# Patient Record
Sex: FEMALE | Race: WHITE | NOT HISPANIC OR LATINO | Employment: FULL TIME | URBAN - METROPOLITAN AREA
[De-identification: names, ages, dates, MRNs, and addresses within clinical notes are randomized per-mention and may not be internally consistent; named-entity substitution may affect disease eponyms.]

---

## 2017-03-01 DIAGNOSIS — Z01.812 PRE-OPERATIVE LABORATORY EXAMINATION: ICD-10-CM

## 2017-03-01 LAB
BASOPHILS # BLD AUTO: 0.5 % (ref 0–1.8)
BASOPHILS # BLD: 0.03 K/UL (ref 0–0.12)
EOSINOPHIL # BLD AUTO: 0.05 K/UL (ref 0–0.51)
EOSINOPHIL NFR BLD: 0.9 % (ref 0–6.9)
ERYTHROCYTE [DISTWIDTH] IN BLOOD BY AUTOMATED COUNT: 59.5 FL (ref 35.9–50)
HCT VFR BLD AUTO: 41.7 % (ref 37–47)
HGB BLD-MCNC: 13.7 G/DL (ref 12–16)
IMM GRANULOCYTES # BLD AUTO: 0.03 K/UL (ref 0–0.11)
IMM GRANULOCYTES NFR BLD AUTO: 0.5 % (ref 0–0.9)
LYMPHOCYTES # BLD AUTO: 1.85 K/UL (ref 1–4.8)
LYMPHOCYTES NFR BLD: 33.8 % (ref 22–41)
MCH RBC QN AUTO: 39 PG (ref 27–33)
MCHC RBC AUTO-ENTMCNC: 32.9 G/DL (ref 33.6–35)
MCV RBC AUTO: 118.8 FL (ref 81.4–97.8)
MONOCYTES # BLD AUTO: 0.23 K/UL (ref 0–0.85)
MONOCYTES NFR BLD AUTO: 4.2 % (ref 0–13.4)
NEUTROPHILS # BLD AUTO: 3.29 K/UL (ref 2–7.15)
NEUTROPHILS NFR BLD: 60.1 % (ref 44–72)
NRBC # BLD AUTO: 0 K/UL
NRBC BLD AUTO-RTO: 0 /100 WBC
PLATELET # BLD AUTO: 347 K/UL (ref 164–446)
PMV BLD AUTO: 10.2 FL (ref 9–12.9)
RBC # BLD AUTO: 3.51 M/UL (ref 4.2–5.4)
WBC # BLD AUTO: 5.5 K/UL (ref 4.8–10.8)

## 2017-03-01 PROCEDURE — 85025 COMPLETE CBC W/AUTO DIFF WBC: CPT

## 2017-03-01 PROCEDURE — 36415 COLL VENOUS BLD VENIPUNCTURE: CPT

## 2017-03-09 ENCOUNTER — HOSPITAL ENCOUNTER (OUTPATIENT)
Facility: MEDICAL CENTER | Age: 47
End: 2017-03-09
Attending: SPECIALIST | Admitting: SPECIALIST
Payer: COMMERCIAL

## 2017-03-09 VITALS
RESPIRATION RATE: 14 BRPM | DIASTOLIC BLOOD PRESSURE: 85 MMHG | TEMPERATURE: 97.5 F | HEART RATE: 86 BPM | HEIGHT: 69 IN | WEIGHT: 160.94 LBS | BODY MASS INDEX: 23.84 KG/M2 | OXYGEN SATURATION: 96 % | SYSTOLIC BLOOD PRESSURE: 122 MMHG

## 2017-03-09 PROBLEM — H90.71: Status: ACTIVE | Noted: 2017-03-09

## 2017-03-09 LAB — HCG SERPL QL: NEGATIVE

## 2017-03-09 PROCEDURE — 502573 HCHG PACK, ENT: Performed by: SPECIALIST

## 2017-03-09 PROCEDURE — 501423 HCHG SPONGE, SURGIFOAM 12X7: Performed by: SPECIALIST

## 2017-03-09 PROCEDURE — 501669 HCHG TUBING, IRRIG. STR. SHOT: Performed by: SPECIALIST

## 2017-03-09 PROCEDURE — A9270 NON-COVERED ITEM OR SERVICE: HCPCS

## 2017-03-09 PROCEDURE — 110371 HCHG SHELL REV 272: Performed by: SPECIALIST

## 2017-03-09 PROCEDURE — 160041 HCHG SURGERY MINUTES - EA ADDL 1 MIN LEVEL 4: Performed by: SPECIALIST

## 2017-03-09 PROCEDURE — 501838 HCHG SUTURE GENERAL: Performed by: SPECIALIST

## 2017-03-09 PROCEDURE — 500073 HCHG BLADE, BEAVER (EYE): Performed by: SPECIALIST

## 2017-03-09 PROCEDURE — 700102 HCHG RX REV CODE 250 W/ 637 OVERRIDE(OP)

## 2017-03-09 PROCEDURE — A4606 OXYGEN PROBE USED W OXIMETER: HCPCS | Performed by: SPECIALIST

## 2017-03-09 PROCEDURE — 700101 HCHG RX REV CODE 250

## 2017-03-09 PROCEDURE — 160002 HCHG RECOVERY MINUTES (STAT): Performed by: SPECIALIST

## 2017-03-09 PROCEDURE — 500072 HCHG BLADE, BEAVER (ENT): Performed by: SPECIALIST

## 2017-03-09 PROCEDURE — 501422 HCHG SPONGE, SURGIFOAM 100: Performed by: SPECIALIST

## 2017-03-09 PROCEDURE — 110382 HCHG SHELL REV 271: Performed by: SPECIALIST

## 2017-03-09 PROCEDURE — 500427 HCHG DRESSING, GELFILM (ENT): Performed by: SPECIALIST

## 2017-03-09 PROCEDURE — 500123 HCHG BOVIE, CONTROL W/BLADE: Performed by: SPECIALIST

## 2017-03-09 PROCEDURE — 160029 HCHG SURGERY MINUTES - 1ST 30 MINS LEVEL 4: Performed by: SPECIALIST

## 2017-03-09 PROCEDURE — 502240 HCHG MISC OR SUPPLY RC 0272: Performed by: SPECIALIST

## 2017-03-09 PROCEDURE — 160048 HCHG OR STATISTICAL LEVEL 1-5: Performed by: SPECIALIST

## 2017-03-09 PROCEDURE — 160036 HCHG PACU - EA ADDL 30 MINS PHASE I: Performed by: SPECIALIST

## 2017-03-09 PROCEDURE — 160009 HCHG ANES TIME/MIN: Performed by: SPECIALIST

## 2017-03-09 PROCEDURE — 700111 HCHG RX REV CODE 636 W/ 250 OVERRIDE (IP)

## 2017-03-09 PROCEDURE — 500432 HCHG DRESSING, KLING 3: Performed by: SPECIALIST

## 2017-03-09 PROCEDURE — 84703 CHORIONIC GONADOTROPIN ASSAY: CPT

## 2017-03-09 PROCEDURE — 88304 TISSUE EXAM BY PATHOLOGIST: CPT

## 2017-03-09 PROCEDURE — 160035 HCHG PACU - 1ST 60 MINS PHASE I: Performed by: SPECIALIST

## 2017-03-09 PROCEDURE — 500433 HCHG DRESSING, KLING 4': Performed by: SPECIALIST

## 2017-03-09 RX ORDER — SODIUM CHLORIDE, SODIUM LACTATE, POTASSIUM CHLORIDE, CALCIUM CHLORIDE 600; 310; 30; 20 MG/100ML; MG/100ML; MG/100ML; MG/100ML
1000 INJECTION, SOLUTION INTRAVENOUS
Status: COMPLETED | OUTPATIENT
Start: 2017-03-09 | End: 2017-03-09

## 2017-03-09 RX ORDER — MOXIFLOXACIN 5 MG/ML
SOLUTION/ DROPS OPHTHALMIC
Status: DISCONTINUED
Start: 2017-03-09 | End: 2017-03-09 | Stop reason: HOSPADM

## 2017-03-09 RX ORDER — LIDOCAINE HYDROCHLORIDE AND EPINEPHRINE 10; 10 MG/ML; UG/ML
INJECTION, SOLUTION INFILTRATION; PERINEURAL
Status: DISCONTINUED
Start: 2017-03-09 | End: 2017-03-09 | Stop reason: HOSPADM

## 2017-03-09 RX ORDER — BACITRACIN ZINC 500 [USP'U]/G
OINTMENT TOPICAL
Status: DISCONTINUED | OUTPATIENT
Start: 2017-03-09 | End: 2017-03-09 | Stop reason: HOSPADM

## 2017-03-09 RX ORDER — HYDROCODONE BITARTRATE AND ACETAMINOPHEN 5; 325 MG/1; MG/1
1 TABLET ORAL EVERY 4 HOURS PRN
Qty: 20 TAB | Refills: 0 | Status: SHIPPED | OUTPATIENT
Start: 2017-03-09

## 2017-03-09 RX ORDER — LIDOCAINE HYDROCHLORIDE AND EPINEPHRINE 10; 10 MG/ML; UG/ML
INJECTION, SOLUTION INFILTRATION; PERINEURAL
Status: DISCONTINUED | OUTPATIENT
Start: 2017-03-09 | End: 2017-03-09 | Stop reason: HOSPADM

## 2017-03-09 RX ORDER — EPINEPHRINE 1 MG/ML(1)
AMPUL (ML) INJECTION
Status: DISCONTINUED | OUTPATIENT
Start: 2017-03-09 | End: 2017-03-09 | Stop reason: HOSPADM

## 2017-03-09 RX ORDER — MOXIFLOXACIN 5 MG/ML
SOLUTION/ DROPS OPHTHALMIC
Status: DISCONTINUED | OUTPATIENT
Start: 2017-03-09 | End: 2017-03-09 | Stop reason: HOSPADM

## 2017-03-09 RX ORDER — MIDAZOLAM HYDROCHLORIDE 1 MG/ML
INJECTION INTRAMUSCULAR; INTRAVENOUS
Status: DISCONTINUED
Start: 2017-03-09 | End: 2017-03-09 | Stop reason: HOSPADM

## 2017-03-09 RX ORDER — HYDROCODONE BITARTRATE AND ACETAMINOPHEN 5; 325 MG/1; MG/1
1 TABLET ORAL EVERY 4 HOURS PRN
Status: DISCONTINUED | OUTPATIENT
Start: 2017-03-09 | End: 2017-03-09 | Stop reason: HOSPADM

## 2017-03-09 RX ORDER — OXYCODONE HCL 5 MG/5 ML
SOLUTION, ORAL ORAL
Status: COMPLETED
Start: 2017-03-09 | End: 2017-03-09

## 2017-03-09 RX ORDER — ONDANSETRON 2 MG/ML
4 INJECTION INTRAMUSCULAR; INTRAVENOUS
Status: DISCONTINUED | OUTPATIENT
Start: 2017-03-09 | End: 2017-03-09 | Stop reason: HOSPADM

## 2017-03-09 RX ADMIN — SODIUM CHLORIDE, SODIUM LACTATE, POTASSIUM CHLORIDE, CALCIUM CHLORIDE 1000 ML: 600; 310; 30; 20 INJECTION, SOLUTION INTRAVENOUS at 09:28

## 2017-03-09 RX ADMIN — OXYCODONE HYDROCHLORIDE 5 MG: 5 SOLUTION ORAL at 14:37

## 2017-03-09 ASSESSMENT — PAIN SCALES - GENERAL
PAINLEVEL_OUTOF10: 0

## 2017-03-09 NOTE — OR SURGEON
Immediate Post-Operative Note      PreOp Diagnosis: mixed hearing loss, ossicular discontinuity right ear    PostOp Diagnosis: same, cholesteatoma    Procedure(s):  TYMPANOPLASTY right ear with OSSICULAR RECONSTRUCTION CHAIN WITH CARTILAGE GRAFT - Wound Class: Clean Contaminated;  Needle EMG facial nerve with monitoring    Surgeon(s):  Himanshu Whelan M.D.    Anesthesiologist/Type of Anesthesia:  Anesthesiologist: Reema Grullon M.D./General LMA    Surgical Staff:  Circulator: Jory Mcclain R.N.; Kaylin Jones R.N.  Relief Scrub: Corina Del Rosario  Scrub Person: Crystal Rothman    Specimen: cholesteatoma    Estimated Blood Loss: <5ml    Findings: dornhoffer PORP repositioned, repair scutum defect with cartilage graft.    Complications: none        3/9/2017 1:14 PM Himanshu Whelan

## 2017-03-09 NOTE — IP AVS SNAPSHOT
" Home Care Instructions                                                                                                                Name:Michelle Corea  Medical Record Number:[id number]  CSN: [id number]    YOB: 1970   Age: 46 y.o.  Sex: female  HT:1.753 m (5' 9\") WT: 73 kg (160 lb 15 oz)          Admit Date: 3/9/2017     Discharge Date:   Today's Date: 3/9/2017  Attending Doctor:  Himanshu Whelan M.D.                  Allergies:  Nkda                Discharge Instructions         ACTIVITY: Rest and take it easy for the first 24 hours.  A responsible adult is recommended to remain with you during that time.  It is normal to feel sleepy.  We encourage you to not do anything that requires balance, judgment or coordination.    MILD FLU-LIKE SYMPTOMS ARE NORMAL. YOU MAY EXPERIENCE GENERALIZED MUSCLE ACHES, THROAT IRRITATION, HEADACHE AND/OR SOME NAUSEA.    FOR 24 HOURS DO NOT:  Drive, operate machinery or run household appliances.  Drink beer or alcoholic beverages.   Make important decisions or sign legal documents.    SPECIAL INSTRUCTIONS: *SEE POST OPERATIVE INSTRUCTION SHEET, SLEEP WITH RIGHT EAR UP**    DIET: To avoid nausea, slowly advance diet as tolerated, avoiding spicy or greasy foods for the first day.  Add more substantial food to your diet according to your physician's instructions.  Babies can be fed formula or breast milk as soon as they are hungry.  INCREASE FLUIDS AND FIBER TO AVOID CONSTIPATION.    SURGICAL DRESSING/BATHING: *KEEP EAR CLEAN AND DRY**    FOLLOW-UP APPOINTMENT:  A follow-up appointment should be arranged with your doctor in; call to schedule.    You should CALL YOUR PHYSICIAN if you develop:  Fever greater than 101 degrees F.  Pain not relieved by medication, or persistent nausea or vomiting.  Excessive bleeding (blood soaking through dressing) or unexpected drainage from the wound.  Extreme redness or swelling around the incision site, drainage of pus or foul smelling " drainage.  Inability to urinate or empty your bladder within 8 hours.  Problems with breathing or chest pain.    You should call 911 if you develop problems with breathing or chest pain.  If you are unable to contact your doctor or surgical center, you should go to the nearest emergency room or urgent care center.  Physician's telephone #: *[phone]**    If any questions arise, call your doctor.  If your doctor is not available, please feel free to call the Surgical Center at [phone].  The Center is open Monday through Friday from 7AM to 7PM.  You can also call the [employer] HOTLINE open 24 hours/day, 7 days/week and speak to a nurse at [phone], or toll free at [phone].    A registered nurse may call you a few days after your surgery to see how you are doing after your procedure.    MEDICATIONS: Resume taking daily medication.  Take prescribed pain medication with food.  If no medication is prescribed, you may take non-aspirin pain medication if needed.  PAIN MEDICATION CAN BE VERY CONSTIPATING.  Take a stool softener or laxative such as senokot, pericolace, or milk of magnesia if needed.    Prescription given for *NORCO**.  Last pain medication given at *2:30 Pm next dose due at 6:30 PM**.    If your physician has prescribed pain medication that includes Acetaminophen (Tylenol), do not take additional Acetaminophen (Tylenol) while taking the prescribed medication.    Depression / Suicide Risk    As you are discharged from this [facility] facility, it is important to learn how to keep safe from harming yourself.    Recognize the warning signs:  · Abrupt changes in personality, positive or negative- including increase in energy   · Giving away possessions  · Change in eating patterns- significant weight changes-  positive or negative  · Change in sleeping patterns- unable to sleep or sleeping all the time   · Unwillingness or inability to communicate  · Depression  · Unusual sadness,  discouragement and loneliness  · Talk of wanting to die  · Neglect of personal appearance   · Rebelliousness- reckless behavior  · Withdrawal from people/activities they love  · Confusion- inability to concentrate     If you or a loved one observes any of these behaviors or has concerns about self-harm, here's what you can do:  · Talk about it- your feelings and reasons for harming yourself  · Remove any means that you might use to hurt yourself (examples: pills, rope, extension cords, firearm)  · Get professional help from the community (Mental Health, Substance Abuse, psychological counseling)  · Do not be alone:Call your Safe Contact- someone whom you trust who will be there for you.  · Call your local CRISIS HOTLINE 260-1630 or 451-023-4037  · Call your local Children's Mobile Crisis Response Team [place] (235) 475-2814 or www.[employer]  · Call the toll free National Suicide Prevention Hotlines   · National Suicide Prevention Lifeline 126-440-IWKL (0897)  · [place] Hope Line Network 800-SUICIDE (380-8788)       Medication List      START taking these medications        Instructions    hydrocodone-acetaminophen 5-325 MG Tabs per tablet   Commonly known as:  NORCO    Take 1 Tab by mouth every four hours as needed.   Dose:  1 Tab         CONTINUE taking these medications        Instructions    aspirin EC 81 MG Tbec   Commonly known as:  ECOTRIN    Take 81 mg by mouth every day.   Dose:  81 mg       hydroxyurea 500 MG Caps   Commonly known as:  HYDREA    Take 500 mg by mouth every day. 1 tab day 1, 2 tabs day 2 and 3- reapeat   Dose:  500 mg       levothyroxine 100 MCG Tabs   Commonly known as:  SYNTHROID    Take 1 Tab by mouth every day.   Dose:  100 mcg               Medication Information     Above and/or attached are the medications your physician expects you to take upon discharge. Review all of your home medications and newly ordered medications with your doctor and/or pharmacist. Follow  medication instructions as directed by your doctor and/or pharmacist. Please keep your medication list with you and share with your physician. Update the information when medications are discontinued, doses are changed, or new medications (including over-the-counter products) are added; and carry medication information at all times in the event of emergency situations.        Resources     Quit Smoking / Tobacco Use:    I understand the use of any tobacco products increases my chance of suffering from future heart disease or stroke and could cause other illnesses which may shorten my life. Quitting the use of tobacco products is the single most important thing I can do to improve my health. For further information on smoking / tobacco cessation call a Toll Free Quit Line at 1-148.596.3566 (*National [employer] [place]) or 1-225.280.9502 (American Lung Association) or you can access the web based program at www.lungusa.org.    [place] Tobacco Users Help Line:  (373) 743-2239       Toll Free: 1-199.379.3075  Quit Tobacco Program [facility] (895)605-0363    Crisis Hotline:    [place] Crisis Hotline:  9-844-WPKVSEP or 1-413.448.4527    [place] Crisis Hotline:    1-474.858.9175 or 725-742-4806    Discharge Survey:   Thank you for choosing [facility]. We hope we did everything we could to make your hospital stay a pleasant one. You may be receiving a survey and we would appreciate your time and participation in answering the questions. Your input is very valuable to us in our efforts to improve our service to our patients and their families.            Signatures     My signature on this form indicates that:    1. I acknowledge receipt and understanding of these Home Care Instruction.  2. My questions regarding this information have been answered to my satisfaction.  3. I have formulated a plan with my discharge nurse to obtain my prescribed medications for  home.    __________________________________      __________________________________                   Patient Signature                                 Guardian/Responsible Adult Signature      __________________________________                 __________       ________                       Nurse Signature                                               Date                 Time

## 2017-03-09 NOTE — DISCHARGE INSTRUCTIONS
ACTIVITY: Rest and take it easy for the first 24 hours.  A responsible adult is recommended to remain with you during that time.  It is normal to feel sleepy.  We encourage you to not do anything that requires balance, judgment or coordination.    MILD FLU-LIKE SYMPTOMS ARE NORMAL. YOU MAY EXPERIENCE GENERALIZED MUSCLE ACHES, THROAT IRRITATION, HEADACHE AND/OR SOME NAUSEA.    FOR 24 HOURS DO NOT:  Drive, operate machinery or run household appliances.  Drink beer or alcoholic beverages.   Make important decisions or sign legal documents.    SPECIAL INSTRUCTIONS: *SEE POST OPERATIVE INSTRUCTION SHEET, SLEEP WITH RIGHT EAR UP**    DIET: To avoid nausea, slowly advance diet as tolerated, avoiding spicy or greasy foods for the first day.  Add more substantial food to your diet according to your physician's instructions.  Babies can be fed formula or breast milk as soon as they are hungry.  INCREASE FLUIDS AND FIBER TO AVOID CONSTIPATION.    SURGICAL DRESSING/BATHING: *KEEP EAR CLEAN AND DRY**    FOLLOW-UP APPOINTMENT:  A follow-up appointment should be arranged with your doctor in; call to schedule.    You should CALL YOUR PHYSICIAN if you develop:  Fever greater than 101 degrees F.  Pain not relieved by medication, or persistent nausea or vomiting.  Excessive bleeding (blood soaking through dressing) or unexpected drainage from the wound.  Extreme redness or swelling around the incision site, drainage of pus or foul smelling drainage.  Inability to urinate or empty your bladder within 8 hours.  Problems with breathing or chest pain.    You should call 911 if you develop problems with breathing or chest pain.  If you are unable to contact your doctor or surgical center, you should go to the nearest emergency room or urgent care center.  Physician's telephone #: *[phone]**    If any questions arise, call your doctor.  If your doctor is not available, please feel free to call the Surgical Center at [phone].   The Center is open Monday through Friday from 7AM to 7PM.  You can also call the HEALTH HOTLINE open 24 hours/day, 7 days/week and speak to a nurse at [phone], or toll free at [phone].    A registered nurse may call you a few days after your surgery to see how you are doing after your procedure.    MEDICATIONS: Resume taking daily medication.  Take prescribed pain medication with food.  If no medication is prescribed, you may take non-aspirin pain medication if needed.  PAIN MEDICATION CAN BE VERY CONSTIPATING.  Take a stool softener or laxative such as senokot, pericolace, or milk of magnesia if needed.    Prescription given for *NORCO**.  Last pain medication given at *2:30 Pm next dose due at 6:30 PM**.    If your physician has prescribed pain medication that includes Acetaminophen (Tylenol), do not take additional Acetaminophen (Tylenol) while taking the prescribed medication.    Depression / Suicide Risk    As you are discharged from this [facility] facility, it is important to learn how to keep safe from harming yourself.    Recognize the warning signs:  · Abrupt changes in personality, positive or negative- including increase in energy   · Giving away possessions  · Change in eating patterns- significant weight changes-  positive or negative  · Change in sleeping patterns- unable to sleep or sleeping all the time   · Unwillingness or inability to communicate  · Depression  · Unusual sadness, discouragement and loneliness  · Talk of wanting to die  · Neglect of personal appearance   · Rebelliousness- reckless behavior  · Withdrawal from people/activities they love  · Confusion- inability to concentrate     If you or a loved one observes any of these behaviors or has concerns about self-harm, here's what you can do:  · Talk about it- your feelings and reasons for harming yourself  · Remove any means that you might use to hurt yourself (examples: pills, rope, extension cords, firearm)  · Get  professional help from the community (Mental Health, Substance Abuse, psychological counseling)  · Do not be alone:Call your Safe Contact- someone whom you trust who will be there for you.  · Call your local CRISIS HOTLINE 268-3618 or 191-947-3336  · Call your local Children's Mobile Crisis Response Team [place] (259) 197-1569 or www.[employer]  · Call the toll free National Suicide Prevention Hotlines   · National Suicide Prevention Lifeline 494-512-CXQF (7442)  · National Hope Line Network 800-SUICIDE (070-4376)

## 2017-03-09 NOTE — OR NURSING
1311: Rec'd pt from OR with Dr. JULIO CÉSAR Grullon, no airway present, vss, no distress noted, breathing is even and unlabored, right ear has a cottonball and band-aid present, cdi, behind ear a band-aid is also present, cdi, hob elevated, left chest donor graft incision present, covered with tegaderm and minimal sanguinous fluid present under dressing,   1330: pt remains asleep,   1340: pt remains asleep but arouses to voice, no c/o pain, script given to ,  1400: pt taking sips of water, still no c/o pain,   1415:  brought to bedside,   1420: hand-off to Rema BENSON.

## 2017-03-09 NOTE — OR NURSING
1437 Oral pain medication given per Pt request to prevent pain.  Report received from Anel BENSON.  right ear has a cotton ball and band-aid present, CDI, behind ear a band-aid is also present, CDI, hob elevated, left chest donor graft incision present, covered with Tegaderm and minimal sanguinous fluid present under dressing,      1445 Pt eating otter pop.      1515 Pt voided without difficulty.  Steady gait noted.  No c/o of n/v.  States tolerable pain.       1530 Dc instructions completed with Pt and her .        1556 Dc to car via wheel chair, pt has all belongings.

## 2017-03-09 NOTE — IP AVS SNAPSHOT
3/9/2017          Michelle Corea  [address] [place] [address]    Dear Michelle:    [facility] wants to ensure your discharge home is safe and you or your loved ones have had all your questions answered regarding your care after you leave the hospital.    You may receive a telephone call within two days of your discharge.  This call is to make certain you understand your discharge instructions as well as ensure we provided you with the best care possible during your stay with us.     The call will only last approximately 3-5 minutes and will be done by a nurse.    Once again, we want to ensure your discharge home is safe and that you have a clear understanding of any next steps in your care.  If you have any questions or concerns, please do not hesitate to contact us, we are here for you.  Thank you for choosing [facility] for your healthcare needs.    Sincerely,    Todd Henry    [facility]

## 2017-03-10 NOTE — OP REPORT
DATE OF SERVICE:  03/09/2017    SURGEON:  Himanshu Whelan MD    ASSISTANT:  None.    ANESTHESIA:  General given per laryngeal mask airway by Dr. Grullon.    PREOPERATIVE DIAGNOSIS:  Mixed hearing loss, ossicular discontinuity, right   ear.    POSTOPERATIVE DIAGNOSIS:    Mixed hearing loss, ossicular discontinuity, right   ear, recurrent cholesteatoma.    NAME OF THE PROCEDURE:  Left tympanoplasty with ossicular chain   reconstruction, endoscopic ear surgery approach, needle EMG of facial nerve   with intraoperative neurophysiology monitoring and testing.    INDICATIONS FOR PROCEDURE:  Patient has had 3 prior major ear surgeries   including tympanomastoidectomy for cholesteatoma as well as tympanoplasties   with ossicular chain reconstruction.  Most recently, a Dornhoffer prosthesis   was utilized for ossicular chain reconstruction.  Examination has shown the   prosthesis to have rotated and the patient's hearing has deteriorated with a   mixed hearing loss noted.  Gross cholesteatoma recurrence was not apparent on   examination in the office.  She presents now for surgical intervention.  Note   that a dehiscence along the facial nerve fallopian canal had been previously   noted with her prior surgery and so facial nerve monitoring is indicated.    DESCRIPTION OF PROCEDURE:  Patient was brought in the operating room and   placed in supine position on the operating table.  General anesthesia was   induced and laryngeal mask airway was inserted to maintain the airway.  Eyes   were protected with taping and table was turned to 180 degrees.  The head was   then rotated to the left side and attention was directed to the right ear   which had been marked preoperatively.    Needle EMG electrodes were placed in orbicularis oris and orbicularis oculi   muscle groups.  Appropriate grounding electrodes were then placed.  The nerve   integrity monitor was then utilized.  Interelectrode impedance was noted to be   less than 0.2  kilo ohms for both sets of electrodes.  Appropriate EMG   responses were noted with external stimulation and continuous monitoring was   then undertaken throughout the procedure.    Hair was shaved very slightly where a one and half fingerbreadths behind the   pinna was exposed and one fingerbreadth above the pinna was exposed.  The   pinna was then isolated from the scalp hair with Steri-Drapes.  Approximately   4-5 mL of 1% Xylocaine with 1:100,000 epinephrine solution was used to   infiltrate along the right postauricular sulcus, the right posterior conchal   cartilage area, and the cavum conchal area anteriorly.  An additional 0.5 to   0.75 mL of local anesthetic solution was then used to infiltrate along the   external auditory canal meatus in a perimeatal fashion.  Patient's right ear   was then prepped and draped in the usual fashion for ear surgery.    First, the operating microscope was used to visualize the ear.  The patient   was noted to have a relatively small external auditory canal meatus, unable to   visualize the epitympanic area well.  Patient was noted to have retraction of   the tympanic membrane, particularly along its superior aspect into the   epitympanic area with what appears to be rotation of the ossicular prosthesis   inferiorly onto the promontory.    The 0-degree pediatric endoscope was then used for visualization of the   transcanal.  With the improved visualization of the scope, I am able to see   the retraction into the epitympanic area.  There does appear to be some   squamous debris accumulating along the posterior superior aspect, but no   inflammation or granulation tissue is noted.    A transverse canal wall incision was then made along the posterior canal wall   using an angled [place] blade and a weapon was then utilized under endoscopic   visualization to elevate a tympanomeatal flap.  With elevation of the   posterior canal wall skin, cotton balls were impregnated with  epinephrine and   used topically to control bleeding.  Significant adherence was noted along the   posterior canal wall skin in the region of the annular sulcus.  With prior   surgeries, significant scarring has occurred such that with elevation of the   flap, a tear was created along the superior aspect of the posterior canal wall   remnant.  Despite the tear, the posterior aspect of the tympanic membrane was   carefully elevated off what appears to be a cartilage remnant overlying the   partial ossicular reconstruction prosthesis.  The cartilages left attached to   the tympanic membrane remnant and was disarticulated from the head of the   prosthesis with the Lindo needle.  The chorda tympani nerve was visualized and   noted to be intact and draped around the prosthesis at its stem.    A small amount of the bone at the area of the superior aspect of the posterior   canal at the junction of the scutum remnant was taken down with a curette to   allow for better visualization of the epitympanic area.  The epithelium along   the epitympanum and scutum remnant was then carefully elevated with removal of   a portion of the tissue which was felt to be compatible with cholesteatoma   sac along the posterior superior aspect of the epitympanum.  Note that a   30-degree pediatric endoscope was used for visualization around the corner and   into the epitympanic area to allow for sufficient removal using both _____   elevator as well as angled cup forceps.  During dissection in this area, some   stimulation of the facial nerve was noted.  The nerve integrity stimulus probe   was then utilized at a stimulus intensity of 0.3 milliamps to trace across   the horizontal portion of the fallopian canal in second genu with a distinct   dehiscent area was not readily appreciated.  Care was taken with elevation of   the retracted epithelium over this area, however, to avoid injury to the   facial nerve.    Once the epithelium had been  elevated sufficiently from the epitympanic area   in the upper aspect of the TM remnant, the ossicular prosthesis was again   noted to be inferiorly displaced  onto the promontory with the titanium   sitting on the stapes superstructure.  The prosthesis was then manipulated   with angled hook and Lindo needle into a proper position, seated on the stapes   superstructure, which appears to be intact.  Note that no cholesteatoma was   noted in the oval window area.  Once the prosthesis was positioned, the chorda   tympani nerve was noted to still be intact.  There is a malleus remnant   anteriorly and the notch of the Dornhoffer prosthesis was tucked under the   malleus remnant again.    At this point, attention was directed postauricularly.  An incision was made   along the superior aspect of the postauricular sulcus through the skin and   subcutaneous tissue and bleeding was controlled with electrocautery.  The soft   tissue overlying the posterior aspect of the conchal cartilage in the region   of the cava and tobias, then thinned and then the cavum conchal cartilage was   incised sharply with a 15 blade.  The anterior aspect of the cavum cartilage   soft tissue was elevated with scissors and a portion of the cavum conchal   cartilage was then excised.    Perichondrium from the cavum cartilage was cleared away with a weapon.  The   amount of perichondrium harvested was felt to be insufficient for graft use   for the tympanoplasty repair.  A segment of what appears to be temporalis   fascia along the superior aspect of the postauricular incision was identified   just above the pinna and a portion of this was removed for graft use.  The   removed temporalis fascia tissue was placed in a fascial press and then air   dried for graft use.  Bleeding in the wound was again controlled with   electrocautery as necessary.  Postauricular incision was then closed using   interrupted buried subcutaneous stitches of 4-0 Vicryl  followed by a running   simple stitch of 5-0 Prolene.    A portion of the cavum conchal cartilage which had been excised for graft use,   was divided from the remaining tissue and utilized as a graft to cover the   scutal defect and reconstruct the posterior canal wall superiorly.  Gelfoam   was then packed over the promontory to support the Dornhoffer prosthesis in   position.  The cartilage graft was then placed over the Dornhoffer prosthesis   head.  The previously harvested fascia was then trimmed and a portion was   placed over the epitympanum, scutal repair area, and the _____ tympanic   membrane remnant and posterior canal wall flap were then repositioned on top   of the fascial graft.  Gelfoam was then packed over the tympanic membrane   repair area followed by the instillation of bacitracin ointment into the   external canal to fill it as a packing.    At this point, cotton ball and Band-Aid dressings were applied to the external   canal.  A Band-Aid was applied postauricularly.  Needle EMG electrodes were   removed.  The patient was subsequently awakened from anesthesia and extubated   without difficulty.  She was taken from the operating room to the recovery   area, awake, breathing on her own in stable condition.  There were no   complications.  Blood loss during the procedure is felt to be less than 10 mL.       ____________________________________     MD ESTEVAN HI / VINEET    DD:  03/09/2017 16:57:40  DT:  03/09/2017 19:02:18    D#:  [id number]  Job#:  [id number]

## 2017-03-15 NOTE — OP REPORT
DATE OF SERVICE:  03/09/2017    SURGEON:  Himanshu Whelan MD    ASSISTANT:  None.    ANESTHESIA:  General given per laryngeal mask airway by Dr. Grullon.    PREOPERATIVE DIAGNOSIS:  Mixed hearing loss, ossicular discontinuity, right   ear.    POSTOPERATIVE DIAGNOSIS:    Mixed hearing loss, ossicular discontinuity, right   ear, recurrent cholesteatoma.    NAME OF THE PROCEDURE:  Right tympanoplasty with ossicular chain   reconstruction, endoscopic ear surgery approach, needle EMG of facial nerve   with intraoperative neurophysiology monitoring and testing.    INDICATIONS FOR PROCEDURE:  Patient has had 3 prior major ear surgeries   including tympanomastoidectomy for cholesteatoma as well as tympanoplasties   with ossicular chain reconstruction.  Most recently, a Dornhoffer prosthesis   was utilized for ossicular chain reconstruction.  Examination has shown the   prosthesis to have rotated and the patient's hearing has deteriorated with a   mixed hearing loss noted.  Gross cholesteatoma recurrence was not apparent on   examination in the office.  She presents now for surgical intervention.  Note   that a dehiscence along the facial nerve fallopian canal had been previously   noted with her prior surgery and so facial nerve monitoring is indicated.    DESCRIPTION OF PROCEDURE:  Patient was brought in the operating room and   placed in supine position on the operating table.  General anesthesia was   induced and laryngeal mask airway was inserted to maintain the airway.  Eyes   were protected with taping and table was turned to 180 degrees.  The head was   then rotated to the left side and attention was directed to the right ear   which had been marked preoperatively.    Needle EMG electrodes were placed in orbicularis oris and orbicularis oculi   muscle groups.  Appropriate grounding electrodes were then placed.  The nerve   integrity monitor was then utilized.  Interelectrode impedance was noted to be   less than 0.2  kilo ohms for both sets of electrodes.  Appropriate EMG   responses were noted with external stimulation and continuous monitoring was   then undertaken throughout the procedure.    Hair was shaved very slightly where a one and half fingerbreadths behind the   pinna was exposed and one fingerbreadth above the pinna was exposed.  The   pinna was then isolated from the scalp hair with Steri-Drapes.  Approximately   4-5 mL of 1% Xylocaine with 1:100,000 epinephrine solution was used to   infiltrate along the right postauricular sulcus, the right posterior conchal   cartilage area, and the cavum conchal area anteriorly.  An additional 0.5 to   0.75 mL of local anesthetic solution was then used to infiltrate along the   external auditory canal meatus in a perimeatal fashion.  Patient's right ear   was then prepped and draped in the usual fashion for ear surgery.    First, the operating microscope was used to visualize the ear.  The patient   was noted to have a relatively small external auditory canal meatus, unable to   visualize the epitympanic area well.  Patient was noted to have retraction of   the tympanic membrane, particularly along its superior aspect into the   epitympanic area with what appears to be rotation of the ossicular prosthesis   inferiorly onto the promontory.    The 0-degree pediatric endoscope was then used for visualization of the   transcanal.  With the improved visualization of the scope, I am able to see   the retraction into the epitympanic area.  There does appear to be some   squamous debris accumulating along the posterior superior aspect, but no   inflammation or granulation tissue is noted.    A transverse canal wall incision was then made along the posterior canal wall   using an angled [place] blade and a weapon was then utilized under endoscopic   visualization to elevate a tympanomeatal flap.  With elevation of the   posterior canal wall skin, cotton balls were impregnated with  epinephrine and   used topically to control bleeding.  Significant adherence was noted along the   posterior canal wall skin in the region of the annular sulcus.  With prior   surgeries, significant scarring has occurred such that with elevation of the   flap, a tear was created along the superior aspect of the posterior canal wall   remnant.  Despite the tear, the posterior aspect of the tympanic membrane was   carefully elevated off what appears to be a cartilage remnant overlying the   partial ossicular reconstruction prosthesis.  The cartilages left attached to   the tympanic membrane remnant and was disarticulated from the head of the   prosthesis with the Lindo needle.  The chorda tympani nerve was visualized and   noted to be intact and draped around the prosthesis at its stem.    A small amount of the bone at the area of the superior aspect of the posterior   canal at the junction of the scutum remnant was taken down with a curette to   allow for better visualization of the epitympanic area.  The epithelium along   the epitympanum and scutum remnant was then carefully elevated with removal of   a portion of the tissue which was felt to be compatible with cholesteatoma   sac along the posterior superior aspect of the epitympanum.  Note that a   30-degree pediatric endoscope was used for visualization around the corner and   into the epitympanic area to allow for sufficient removal using both _____   elevator as well as angled cup forceps.  During dissection in this area, some   stimulation of the facial nerve was noted.  The nerve integrity stimulus probe   was then utilized at a stimulus intensity of 0.3 milliamps to trace across   the horizontal portion of the fallopian canal in second genu with a distinct   dehiscent area was not readily appreciated.  Care was taken with elevation of   the retracted epithelium over this area, however, to avoid injury to the   facial nerve.    Once the epithelium had been  elevated sufficiently from the epitympanic area   in the upper aspect of the TM remnant, the ossicular prosthesis was again   noted to be inferiorly displaced  onto the promontory with the titanium   sitting on the stapes superstructure.  The prosthesis was then manipulated   with angled hook and Lindo needle into a proper position, seated on the stapes   superstructure, which appears to be intact.  Note that no cholesteatoma was   noted in the oval window area.  Once the prosthesis was positioned, the chorda   tympani nerve was noted to still be intact.  There is a malleus remnant   anteriorly and the notch of the Dornhoffer prosthesis was tucked under the   malleus remnant again.    At this point, attention was directed postauricularly.  An incision was made   along the superior aspect of the postauricular sulcus through the skin and   subcutaneous tissue and bleeding was controlled with electrocautery.  The soft   tissue overlying the posterior aspect of the conchal cartilage in the region   of the cava and tobias, then thinned and then the cavum conchal cartilage was   incised sharply with a 15 blade.  The anterior aspect of the cavum cartilage   soft tissue was elevated with scissors and a portion of the cavum conchal   cartilage was then excised.    Perichondrium from the cavum cartilage was cleared away with a weapon.  The   amount of perichondrium harvested was felt to be insufficient for graft use   for the tympanoplasty repair.  A segment of what appears to be temporalis   fascia along the superior aspect of the postauricular incision was identified   just above the pinna and a portion of this was removed for graft use.  The   removed temporalis fascia tissue was placed in a fascial press and then air   dried for graft use.  Bleeding in the wound was again controlled with   electrocautery as necessary.  Postauricular incision was then closed using   interrupted buried subcutaneous stitches of 4-0 Vicryl  followed by a running   simple stitch of 5-0 Prolene.    A portion of the cavum conchal cartilage which had been excised for graft use,   was divided from the remaining tissue and utilized as a graft to cover the   scutal defect and reconstruct the posterior canal wall superiorly.  Gelfoam   was then packed over the promontory to support the Dornhoffer prosthesis in   position.  The cartilage graft was then placed over the Dornhoffer prosthesis   head.  The previously harvested fascia was then trimmed and a portion was   placed over the epitympanum, scutal repair area, and the _____ tympanic   membrane remnant and posterior canal wall flap were then repositioned on top   of the fascial graft.  Gelfoam was then packed over the tympanic membrane   repair area followed by the instillation of bacitracin ointment into the   external canal to fill it as a packing.    At this point, cotton ball and Band-Aid dressings were applied to the external   canal.  A Band-Aid was applied postauricularly.  Needle EMG electrodes were   removed.  The patient was subsequently awakened from anesthesia and extubated   without difficulty.  She was taken from the operating room to the recovery   area, awake, breathing on her own in stable condition.  There were no   complications.  Blood loss during the procedure is felt to be less than 10 mL.       ____________________________________     MD ESTEVAN HI / VINEET    DD:  03/09/2017 16:57:40  DT:  03/09/2017 19:02:18    D#:  [id number]  Job#:  [id number]

## 2021-01-13 ENCOUNTER — HOSPITAL ENCOUNTER (OUTPATIENT)
Facility: MEDICAL CENTER | Age: 51
End: 2021-01-13
Attending: PHYSICIAN ASSISTANT
Payer: COMMERCIAL

## 2021-01-13 PROCEDURE — U0005 INFEC AGEN DETEC AMPLI PROBE: HCPCS

## 2021-01-13 PROCEDURE — U0003 INFECTIOUS AGENT DETECTION BY NUCLEIC ACID (DNA OR RNA); SEVERE ACUTE RESPIRATORY SYNDROME CORONAVIRUS 2 (SARS-COV-2) (CORONAVIRUS DISEASE [COVID-19]), AMPLIFIED PROBE TECHNIQUE, MAKING USE OF HIGH THROUGHPUT TECHNOLOGIES AS DESCRIBED BY CMS-2020-01-R: HCPCS

## 2021-01-14 LAB
COVID ORDER STATUS COVID19: NORMAL
SARS-COV-2 RNA RESP QL NAA+PROBE: NOTDETECTED
SPECIMEN SOURCE: NORMAL

## 2021-08-19 ENCOUNTER — HOSPITAL ENCOUNTER (OUTPATIENT)
Dept: RADIOLOGY | Facility: MEDICAL CENTER | Age: 51
End: 2021-08-19
Attending: INTERNAL MEDICINE
Payer: COMMERCIAL

## 2021-08-19 DIAGNOSIS — Z79.899 NEED FOR PROPHYLACTIC CHEMOTHERAPY: ICD-10-CM

## 2021-08-19 PROCEDURE — 93922 UPR/L XTREMITY ART 2 LEVELS: CPT

## 2021-08-27 ENCOUNTER — HOSPITAL ENCOUNTER (OUTPATIENT)
Dept: RADIOLOGY | Facility: MEDICAL CENTER | Age: 51
End: 2021-08-27
Attending: INTERNAL MEDICINE
Payer: COMMERCIAL

## 2021-08-27 DIAGNOSIS — I82.401 ACUTE DEEP VEIN THROMBOSIS (DVT) OF RIGHT LOWER EXTREMITY, UNSPECIFIED VEIN (HCC): ICD-10-CM

## 2021-08-27 DIAGNOSIS — D45 CHRONIC ERYTHREMIA IN REMISSION (HCC): ICD-10-CM

## 2021-08-27 DIAGNOSIS — Z79.899 NEED FOR PROPHYLACTIC CHEMOTHERAPY: ICD-10-CM

## 2021-08-27 PROCEDURE — 93970 EXTREMITY STUDY: CPT

## 2021-09-14 ENCOUNTER — PRE-ADMISSION TESTING (OUTPATIENT)
Dept: ADMISSIONS | Facility: MEDICAL CENTER | Age: 51
End: 2021-09-14
Attending: SURGERY
Payer: COMMERCIAL

## 2021-09-17 ENCOUNTER — PRE-ADMISSION TESTING (OUTPATIENT)
Dept: ADMISSIONS | Facility: MEDICAL CENTER | Age: 51
End: 2021-09-17
Attending: SURGERY
Payer: COMMERCIAL

## 2021-09-17 DIAGNOSIS — Z01.812 PRE-OPERATIVE LABORATORY EXAMINATION: ICD-10-CM

## 2021-09-17 DIAGNOSIS — Z01.810 PRE-OPERATIVE CARDIOVASCULAR EXAMINATION: ICD-10-CM

## 2021-09-17 LAB
ALBUMIN SERPL BCP-MCNC: 4.4 G/DL (ref 3.2–4.9)
ALBUMIN/GLOB SERPL: 1.6 G/DL
ALP SERPL-CCNC: 123 U/L (ref 30–99)
ALT SERPL-CCNC: 11 U/L (ref 2–50)
ANION GAP SERPL CALC-SCNC: 10 MMOL/L (ref 7–16)
AST SERPL-CCNC: 15 U/L (ref 12–45)
BASOPHILS # BLD AUTO: 0.6 % (ref 0–1.8)
BASOPHILS # BLD: 0.02 K/UL (ref 0–0.12)
BILIRUB SERPL-MCNC: 1.3 MG/DL (ref 0.1–1.5)
BUN SERPL-MCNC: 11 MG/DL (ref 8–22)
CALCIUM SERPL-MCNC: 8.5 MG/DL (ref 8.5–10.5)
CHLORIDE SERPL-SCNC: 105 MMOL/L (ref 96–112)
CO2 SERPL-SCNC: 25 MMOL/L (ref 20–33)
CREAT SERPL-MCNC: 0.67 MG/DL (ref 0.5–1.4)
EKG IMPRESSION: NORMAL
EOSINOPHIL # BLD AUTO: 0.02 K/UL (ref 0–0.51)
EOSINOPHIL NFR BLD: 0.6 % (ref 0–6.9)
ERYTHROCYTE [DISTWIDTH] IN BLOOD BY AUTOMATED COUNT: 63.6 FL (ref 35.9–50)
GLOBULIN SER CALC-MCNC: 2.8 G/DL (ref 1.9–3.5)
GLUCOSE SERPL-MCNC: 117 MG/DL (ref 65–99)
HCT VFR BLD AUTO: 40.8 % (ref 37–47)
HGB BLD-MCNC: 14 G/DL (ref 12–16)
IMM GRANULOCYTES # BLD AUTO: 0.02 K/UL (ref 0–0.11)
IMM GRANULOCYTES NFR BLD AUTO: 0.6 % (ref 0–0.9)
INR PPP: 0.99 (ref 0.87–1.13)
LYMPHOCYTES # BLD AUTO: 1.03 K/UL (ref 1–4.8)
LYMPHOCYTES NFR BLD: 32.8 % (ref 22–41)
MCH RBC QN AUTO: 43.5 PG (ref 27–33)
MCHC RBC AUTO-ENTMCNC: 34.3 G/DL (ref 33.6–35)
MCV RBC AUTO: 126.7 FL (ref 81.4–97.8)
MONOCYTES # BLD AUTO: 0.17 K/UL (ref 0–0.85)
MONOCYTES NFR BLD AUTO: 5.4 % (ref 0–13.4)
NEUTROPHILS # BLD AUTO: 1.88 K/UL (ref 2–7.15)
NEUTROPHILS NFR BLD: 60 % (ref 44–72)
NRBC # BLD AUTO: 0 K/UL
NRBC BLD-RTO: 0 /100 WBC
PLATELET # BLD AUTO: 450 K/UL (ref 164–446)
PMV BLD AUTO: 9.8 FL (ref 9–12.9)
POTASSIUM SERPL-SCNC: 3.8 MMOL/L (ref 3.6–5.5)
PROT SERPL-MCNC: 7.2 G/DL (ref 6–8.2)
PROTHROMBIN TIME: 12.8 SEC (ref 12–14.6)
RBC # BLD AUTO: 3.22 M/UL (ref 4.2–5.4)
SARS-COV-2 RNA RESP QL NAA+PROBE: NOTDETECTED
SODIUM SERPL-SCNC: 140 MMOL/L (ref 135–145)
SPECIMEN SOURCE: NORMAL
WBC # BLD AUTO: 3.1 K/UL (ref 4.8–10.8)

## 2021-09-17 PROCEDURE — 93005 ELECTROCARDIOGRAM TRACING: CPT

## 2021-09-17 PROCEDURE — 85610 PROTHROMBIN TIME: CPT

## 2021-09-17 PROCEDURE — U0003 INFECTIOUS AGENT DETECTION BY NUCLEIC ACID (DNA OR RNA); SEVERE ACUTE RESPIRATORY SYNDROME CORONAVIRUS 2 (SARS-COV-2) (CORONAVIRUS DISEASE [COVID-19]), AMPLIFIED PROBE TECHNIQUE, MAKING USE OF HIGH THROUGHPUT TECHNOLOGIES AS DESCRIBED BY CMS-2020-01-R: HCPCS

## 2021-09-17 PROCEDURE — 36415 COLL VENOUS BLD VENIPUNCTURE: CPT

## 2021-09-17 PROCEDURE — U0005 INFEC AGEN DETEC AMPLI PROBE: HCPCS

## 2021-09-17 PROCEDURE — 93010 ELECTROCARDIOGRAM REPORT: CPT | Performed by: INTERNAL MEDICINE

## 2021-09-17 PROCEDURE — 85025 COMPLETE CBC W/AUTO DIFF WBC: CPT

## 2021-09-17 PROCEDURE — C9803 HOPD COVID-19 SPEC COLLECT: HCPCS

## 2021-09-17 PROCEDURE — 80053 COMPREHEN METABOLIC PANEL: CPT

## 2021-09-21 ENCOUNTER — APPOINTMENT (OUTPATIENT)
Dept: RADIOLOGY | Facility: MEDICAL CENTER | Age: 51
End: 2021-09-21
Attending: SURGERY
Payer: COMMERCIAL

## 2021-09-21 ENCOUNTER — ANESTHESIA EVENT (OUTPATIENT)
Dept: SURGERY | Facility: MEDICAL CENTER | Age: 51
End: 2021-09-21
Payer: COMMERCIAL

## 2021-09-21 ENCOUNTER — HOSPITAL ENCOUNTER (OUTPATIENT)
Facility: MEDICAL CENTER | Age: 51
End: 2021-09-21
Attending: SURGERY | Admitting: SURGERY
Payer: COMMERCIAL

## 2021-09-21 ENCOUNTER — ANESTHESIA (OUTPATIENT)
Dept: SURGERY | Facility: MEDICAL CENTER | Age: 51
End: 2021-09-21
Payer: COMMERCIAL

## 2021-09-21 VITALS
SYSTOLIC BLOOD PRESSURE: 107 MMHG | RESPIRATION RATE: 16 BRPM | WEIGHT: 161.6 LBS | TEMPERATURE: 97.9 F | OXYGEN SATURATION: 98 % | HEIGHT: 68 IN | DIASTOLIC BLOOD PRESSURE: 69 MMHG | BODY MASS INDEX: 24.49 KG/M2 | HEART RATE: 82 BPM

## 2021-09-21 DIAGNOSIS — C43.62 MALIGNANT MELANOMA OF LEFT UPPER EXTREMITY INCLUDING SHOULDER (HCC): ICD-10-CM

## 2021-09-21 PROCEDURE — 160002 HCHG RECOVERY MINUTES (STAT): Performed by: SURGERY

## 2021-09-21 PROCEDURE — 88307 TISSUE EXAM BY PATHOLOGIST: CPT

## 2021-09-21 PROCEDURE — 160035 HCHG PACU - 1ST 60 MINS PHASE I: Performed by: SURGERY

## 2021-09-21 PROCEDURE — 88305 TISSUE EXAM BY PATHOLOGIST: CPT

## 2021-09-21 PROCEDURE — A4565 SLINGS: HCPCS | Performed by: SURGERY

## 2021-09-21 PROCEDURE — 700111 HCHG RX REV CODE 636 W/ 250 OVERRIDE (IP): Performed by: ANESTHESIOLOGY

## 2021-09-21 PROCEDURE — 700101 HCHG RX REV CODE 250: Performed by: ANESTHESIOLOGY

## 2021-09-21 PROCEDURE — 160025 RECOVERY II MINUTES (STATS): Performed by: SURGERY

## 2021-09-21 PROCEDURE — 160041 HCHG SURGERY MINUTES - EA ADDL 1 MIN LEVEL 4: Performed by: SURGERY

## 2021-09-21 PROCEDURE — 700105 HCHG RX REV CODE 258: Performed by: SURGERY

## 2021-09-21 PROCEDURE — 160029 HCHG SURGERY MINUTES - 1ST 30 MINS LEVEL 4: Performed by: SURGERY

## 2021-09-21 PROCEDURE — 700101 HCHG RX REV CODE 250: Performed by: SURGERY

## 2021-09-21 PROCEDURE — A9270 NON-COVERED ITEM OR SERVICE: HCPCS | Performed by: ANESTHESIOLOGY

## 2021-09-21 PROCEDURE — 160048 HCHG OR STATISTICAL LEVEL 1-5: Performed by: SURGERY

## 2021-09-21 PROCEDURE — 38792 RA TRACER ID OF SENTINL NODE: CPT | Mod: LT

## 2021-09-21 PROCEDURE — 88341 IMHCHEM/IMCYTCHM EA ADD ANTB: CPT | Mod: 91

## 2021-09-21 PROCEDURE — 88342 IMHCHEM/IMCYTCHM 1ST ANTB: CPT

## 2021-09-21 PROCEDURE — 160009 HCHG ANES TIME/MIN: Performed by: SURGERY

## 2021-09-21 PROCEDURE — 501838 HCHG SUTURE GENERAL: Performed by: SURGERY

## 2021-09-21 PROCEDURE — 700102 HCHG RX REV CODE 250 W/ 637 OVERRIDE(OP): Performed by: ANESTHESIOLOGY

## 2021-09-21 PROCEDURE — 160046 HCHG PACU - 1ST 60 MINS PHASE II: Performed by: SURGERY

## 2021-09-21 RX ORDER — HALOPERIDOL 5 MG/ML
1 INJECTION INTRAMUSCULAR
Status: DISCONTINUED | OUTPATIENT
Start: 2021-09-21 | End: 2021-09-21 | Stop reason: HOSPADM

## 2021-09-21 RX ORDER — ONDANSETRON 2 MG/ML
4 INJECTION INTRAMUSCULAR; INTRAVENOUS
Status: DISCONTINUED | OUTPATIENT
Start: 2021-09-21 | End: 2021-09-21 | Stop reason: HOSPADM

## 2021-09-21 RX ORDER — ACETAMINOPHEN 500 MG
1000 TABLET ORAL ONCE
Status: COMPLETED | OUTPATIENT
Start: 2021-09-21 | End: 2021-09-21

## 2021-09-21 RX ORDER — BUPIVACAINE HYDROCHLORIDE AND EPINEPHRINE 5; 5 MG/ML; UG/ML
INJECTION, SOLUTION EPIDURAL; INTRACAUDAL; PERINEURAL
Status: DISCONTINUED
Start: 2021-09-21 | End: 2021-09-21 | Stop reason: HOSPADM

## 2021-09-21 RX ORDER — OXYCODONE HYDROCHLORIDE AND ACETAMINOPHEN 5; 325 MG/1; MG/1
1 TABLET ORAL
Status: DISCONTINUED | OUTPATIENT
Start: 2021-09-21 | End: 2021-09-21 | Stop reason: HOSPADM

## 2021-09-21 RX ORDER — KETOROLAC TROMETHAMINE 30 MG/ML
INJECTION, SOLUTION INTRAMUSCULAR; INTRAVENOUS PRN
Status: DISCONTINUED | OUTPATIENT
Start: 2021-09-21 | End: 2021-09-21 | Stop reason: SURG

## 2021-09-21 RX ORDER — SODIUM CHLORIDE, SODIUM LACTATE, POTASSIUM CHLORIDE, CALCIUM CHLORIDE 600; 310; 30; 20 MG/100ML; MG/100ML; MG/100ML; MG/100ML
INJECTION, SOLUTION INTRAVENOUS CONTINUOUS
Status: DISCONTINUED | OUTPATIENT
Start: 2021-09-21 | End: 2021-09-21 | Stop reason: HOSPADM

## 2021-09-21 RX ORDER — BUPIVACAINE HYDROCHLORIDE AND EPINEPHRINE 5; 5 MG/ML; UG/ML
INJECTION, SOLUTION EPIDURAL; INTRACAUDAL; PERINEURAL
Status: DISCONTINUED | OUTPATIENT
Start: 2021-09-21 | End: 2021-09-21 | Stop reason: HOSPADM

## 2021-09-21 RX ORDER — CEFAZOLIN SODIUM 1 G/3ML
INJECTION, POWDER, FOR SOLUTION INTRAMUSCULAR; INTRAVENOUS PRN
Status: DISCONTINUED | OUTPATIENT
Start: 2021-09-21 | End: 2021-09-21 | Stop reason: SURG

## 2021-09-21 RX ORDER — DIPHENHYDRAMINE HYDROCHLORIDE 50 MG/ML
12.5 INJECTION INTRAMUSCULAR; INTRAVENOUS
Status: DISCONTINUED | OUTPATIENT
Start: 2021-09-21 | End: 2021-09-21 | Stop reason: HOSPADM

## 2021-09-21 RX ORDER — ONDANSETRON 2 MG/ML
INJECTION INTRAMUSCULAR; INTRAVENOUS PRN
Status: DISCONTINUED | OUTPATIENT
Start: 2021-09-21 | End: 2021-09-21 | Stop reason: SURG

## 2021-09-21 RX ORDER — PHENYLEPHRINE HCL IN 0.9% NACL 0.5 MG/5ML
SYRINGE (ML) INTRAVENOUS PRN
Status: DISCONTINUED | OUTPATIENT
Start: 2021-09-21 | End: 2021-09-21 | Stop reason: SURG

## 2021-09-21 RX ORDER — DEXAMETHASONE SODIUM PHOSPHATE 4 MG/ML
INJECTION, SOLUTION INTRA-ARTICULAR; INTRALESIONAL; INTRAMUSCULAR; INTRAVENOUS; SOFT TISSUE PRN
Status: DISCONTINUED | OUTPATIENT
Start: 2021-09-21 | End: 2021-09-21 | Stop reason: SURG

## 2021-09-21 RX ORDER — MIDAZOLAM HYDROCHLORIDE 1 MG/ML
INJECTION INTRAMUSCULAR; INTRAVENOUS PRN
Status: DISCONTINUED | OUTPATIENT
Start: 2021-09-21 | End: 2021-09-21 | Stop reason: SURG

## 2021-09-21 RX ORDER — OXYCODONE HYDROCHLORIDE AND ACETAMINOPHEN 5; 325 MG/1; MG/1
2 TABLET ORAL
Status: DISCONTINUED | OUTPATIENT
Start: 2021-09-21 | End: 2021-09-21 | Stop reason: HOSPADM

## 2021-09-21 RX ORDER — ISOSULFAN BLUE 50 MG/5ML
INJECTION, SOLUTION SUBCUTANEOUS
Status: DISCONTINUED
Start: 2021-09-21 | End: 2021-09-21 | Stop reason: HOSPADM

## 2021-09-21 RX ADMIN — MIDAZOLAM HYDROCHLORIDE 2 MG: 1 INJECTION, SOLUTION INTRAMUSCULAR; INTRAVENOUS at 16:19

## 2021-09-21 RX ADMIN — DEXAMETHASONE SODIUM PHOSPHATE 4 MG: 4 INJECTION, SOLUTION INTRA-ARTICULAR; INTRALESIONAL; INTRAMUSCULAR; INTRAVENOUS; SOFT TISSUE at 16:19

## 2021-09-21 RX ADMIN — ACETAMINOPHEN 1000 MG: 500 TABLET ORAL at 18:02

## 2021-09-21 RX ADMIN — PROPOFOL 50 MG: 10 INJECTION, EMULSION INTRAVENOUS at 17:15

## 2021-09-21 RX ADMIN — CEFAZOLIN 2 G: 330 INJECTION, POWDER, FOR SOLUTION INTRAMUSCULAR; INTRAVENOUS at 16:24

## 2021-09-21 RX ADMIN — FENTANYL CITRATE 100 MCG: 50 INJECTION, SOLUTION INTRAMUSCULAR; INTRAVENOUS at 16:19

## 2021-09-21 RX ADMIN — KETOROLAC TROMETHAMINE 30 MG: 30 INJECTION, SOLUTION INTRAMUSCULAR at 16:19

## 2021-09-21 RX ADMIN — Medication 200 MCG: at 16:49

## 2021-09-21 RX ADMIN — EPHEDRINE SULFATE 10 MG: 50 INJECTION, SOLUTION INTRAVENOUS at 16:51

## 2021-09-21 RX ADMIN — PROPOFOL 150 MG: 10 INJECTION, EMULSION INTRAVENOUS at 16:19

## 2021-09-21 RX ADMIN — SODIUM CHLORIDE, POTASSIUM CHLORIDE, SODIUM LACTATE AND CALCIUM CHLORIDE: 600; 310; 30; 20 INJECTION, SOLUTION INTRAVENOUS at 16:17

## 2021-09-21 RX ADMIN — ONDANSETRON 4 MG: 2 INJECTION INTRAMUSCULAR; INTRAVENOUS at 17:15

## 2021-09-21 ASSESSMENT — FIBROSIS 4 INDEX: FIB4 SCORE: 0.52

## 2021-09-21 ASSESSMENT — PAIN DESCRIPTION - PAIN TYPE
TYPE: SURGICAL PAIN

## 2021-09-21 ASSESSMENT — PAIN SCALES - GENERAL: PAIN_LEVEL: 2

## 2021-09-21 NOTE — ANESTHESIA PREPROCEDURE EVALUATION
Relevant Problems   ENDO   (positive) Hypothyroid      Other   (positive) Myeloproliferative disorder (HCC)   (positive) Polycythemia vera (HCC)       Physical Exam    Airway   Mallampati: II  TM distance: >3 FB  Neck ROM: full       Cardiovascular - normal exam  Rhythm: regular  Rate: normal  (-) murmur     Dental - normal exam           Pulmonary - normal exam  Breath sounds clear to auscultation     Abdominal    Neurological - normal exam                 Anesthesia Plan    ASA 1       Plan - general       Airway plan will be LMA        Plan Factors:   Patient was previously instructed to abstain from smoking on day of procedure.  Patient did not smoke on day of procedure.      Induction: intravenous    Postoperative Plan: Postoperative administration of opioids is intended.    Pertinent diagnostic labs and testing reviewed    Informed Consent:    Anesthetic plan and risks discussed with patient.    Use of blood products discussed with: patient whom consented to blood products.

## 2021-09-21 NOTE — ANESTHESIA PROCEDURE NOTES
Airway    Date/Time: 9/21/2021 4:20 PM  Performed by: Ronald Zapata M.D.  Authorized by: Ronald Zapata M.D.     Location:  OR  Urgency:  Elective  Indications for Airway Management:  Anesthesia      Spontaneous Ventilation: absent    Sedation Level:  Deep  Preoxygenated: Yes    Final Airway Type:  Supraglottic airway  Final Supraglottic Airway:  Standard LMA    SGA Size:  3  Number of Attempts at Approach:  1

## 2021-09-22 LAB — PATHOLOGY CONSULT NOTE: NORMAL

## 2021-09-22 NOTE — OR SURGEON
Immediate Post OP Note    PreOp Diagnosis: Malignant Melanoma of Left Upper Arm      PostOp Diagnosis: same      Procedure(s):  RADICAL WIDE EXCISION, LESION - LEFT UPPER ARM MALIGNANT MELANOMA - Wound Class: Clean  BIOPSY, LYMPH NODE, SENTINEL - AXILLARY (RADIONUCLIDE) WITHOUT IMAGING - Wound Class: Clean    Surgeon(s):  Maverick Veliz M.D.    Anesthesiologist/Type of Anesthesia:  Anesthesiologist: Ronald Zapata M.D./General    Surgical Staff:  Assistant: MARV Rojas  Circulator: Daphnie Fletcher R.N.; Geneva Rees R.N.  Relief Scrub: Yvonne Gamboa  Scrub Person: Jorge Donis; Minerva Black    Specimens removed if any:  ID Type Source Tests Collected by Time Destination   A : left axillary sentinel node Tissue Lymph Node PATHOLOGY SPECIMEN Maverick Veliz M.D. 9/21/2021  4:43 PM    B : malignant melanoma of left upper arm (short suture superior at 3oclock, long suture lateral) Other Other PATHOLOGY SPECIMEN Maverick Veliz M.D. 9/21/2021  5:02 PM        Estimated Blood Loss: minimal    Findings: 1 large sentinel node identified with radionuclide technique; 1cm minimal margin superior and inferior and 4cm margin medial and laterally.    Complications: no apparent complications        9/21/2021 5:33 PM Maverick Veliz M.D.

## 2021-09-22 NOTE — ANESTHESIA TIME REPORT
Anesthesia Start and Stop Event Times     Date Time Event    9/21/2021 1559 Ready for Procedure     1617 Anesthesia Start     1731 Anesthesia Stop        Responsible Staff  09/21/21    Name Role Begin End    Ronald Zapata M.D. Anesth 1617 1731        Preop Diagnosis (Free Text):  Pre-op Diagnosis     MALIGNANT MELANOMA OF UPPER ARM-LEFT        Preop Diagnosis (Codes):    Post op Diagnosis  Malignant melanoma (HCC)      Premium Reason  A. 3PM - 7AM    Comments:

## 2021-09-22 NOTE — OR NURSING
1729 received patient from OR. Report from Anesthesiologist and OR RN. Patient on 4L at 98 % O2. VSS. Monitor connected. S/P L upper arm lesion and lymph node excision, incision SARAH, wrapped in coban    1748 Patient denies pain or nausea at this time, tolerating sips of water    1802 1g Tylenol given per Dr. Zapata    1807 Discharge instructions reviewed with patient's , all questions answered    1830 Patient escorted out to responsible adult via wheelchair by RN. Belongings with patient, ambulated with steady gait. Discharge paperwork and instructions reviewed, signed, and sent with patient.

## 2021-09-22 NOTE — OP REPORT
DATE OF SERVICE:  09/21/2021     SURGEON:  Maverick Veliz MD     ASSISTANT:  MUMTAZ Riley     ANESTHESIOLOGIST:  Ronald Zapata MD     TYPE OF ANESTHETIC:  General anesthetic using laryngeal mask airway plus local   0.5% Marcaine with epinephrine.     PREOPERATIVE DIAGNOSIS:  Malignant melanoma of the left upper posterior arm.     POSTOPERATIVE DIAGNOSIS:  Malignant melanoma of the left upper posterior arm.     PROCEDURES:  1.  Radical wide excision of malignant melanoma of the left posterior upper   arm with layered closure.  2.  Left axillary sentinel node mapping.  3.  Left axillary sentinel node biopsy.     FINDINGS:  The patient is a 50-year-old female who presented with a mole in   her posterior aspect of her left upper arm just above the elbow that was   changing in character and size.  She had a biopsy performed which revealed a   superficial spreading malignant melanoma of the left upper arm that was 1.3 mm   in depth.  There was focal ulceration and mitotic rate was 2 per square mm.    After options were discussed and NCCN guidelines were reviewed, the patient   elected to proceed with a radical wide excision of the malignant melanoma with   a primary closure along with left sentinel node biopsy.  This was performed   today without apparent complications.  The patient was found to have   relatively large lymph node in the left axilla that was identified using   radionuclide technique.  This may have been 2 nodes fused together.  The   patient also had a wide excision of melanoma of the posterior aspect of the   left upper arm just above the elbow.  This was done in an oblique fashion to   avoid crossing the elbow crease.  A 1 cm minimal margin was obtained around   the melanoma on the medial and lateral aspect and then 4-5 cm obtained both   superiorly and inferiorly.  There were no apparent complications.     FINDINGS AND PROCEDURE:  The patient was brought to the operating room and    placed on the table in supine position.  General anesthetic was then provided   by Dr. Ronald Zapata, the anesthesiologist.  A timeout was called.  The   correct patient, correct diagnosis, correct surgery, and correct location of   the surgery were discussed and agreed upon.  She did receive preoperative IV   antibiotics.  The entire left upper extremity and axillary area were prepped   and draped in the usual sterile fashion.  Incision was made just below the   hair-bearing portion of the left axilla with #15 blade.  All bleeding was   controlled with electrocautery.  Dissection was then carried down through the   generous subcutaneous tissue and adipose tissue until the clavipectoral fascia   was incised.  This allowed access to the left axilla.  Using a Neoprobe, one   relatively large lymph node that measured about 3x2 cm was dissected out in   the mid axilla with the LigaSure device.  This may have been 2 nodes that were   fused together.  The radioactive counts were over 1000. Interrogation of the   left axilla did not reveal any areas hotter than 10% of the hottest node.  Therefore, the   wound was irrigated with approximately 100 mL of warm normal saline.  The deep   tissue was injected with 0.5% Marcaine with epinephrine.  The clavipectoral   fascia and the dermis were each closed using running 3-0 Vicryl suture.  The   skin was closed using running 4-0 Monocryl suture in subcuticular fashion.    Steri-Strips and a sterile dressing applied.  The patient's arm, which had   been prepped was then brought across the body to expose the posterior aspect   of the left upper arm.  The previous biopsy site was identified.  A ruler was   used to steve a 1 cm margin around the outer most part of the biopsy site.  An   elliptical incision was then designed to incorporate this 1 cm minimal margin,   but extending upwards approximately 5 cm and obliquely downwards and medially   approximately 5 cm. Incision was made  through the skin and dermis with #15   blade.  All bleeding was controlled with electrocautery.  Dissection was   carried down through the subcutaneous tissue and carried down all the way   until the triceps fascia was identified.  The entire specimen was dissected   off the triceps fascia, taking all of the fatty and adipose tissue and   lymphatic structures from the muscle up including all the skin.  The specimen   was oriented with a short suture for superior margin and a long suture for the   lateral margin and sent to pathology for further characterization.  The   remaining tissue was then elevated off the triceps fascia circumferentially   for 3-4 cm in all directions in order to provide enough laxity for the wound   to be brought back together.  The wound was then injected with 0.5% Marcaine.    The wound was closed using 3-0 Vicryl sutures in interrupted fashion for the   subcutaneous tissue and 3-0 nylon sutures for the skin in interrupted fashion.    Steri-Strips and sterile dressing applied.  A Coban wrap was applied.  The   patient was placed into a sling.     An assistant was utilized for this surgery and it was absolutely necessary for   completion of the operation.  The assistant assisted with the initial   incision as well as retracting vital structures, so that the dissection of   both the melanoma and axillary amy  structures could be performed and the assistant   also assisted with the multilayer closure.        ______________________________  MD JOSE KNOWLES/JOHANNE/NIT    DD:  09/21/2021 19:30  DT:  09/21/2021 20:51    Job#:  [id number]    CC:NIKIA MILLARD MD

## 2021-09-22 NOTE — ANESTHESIA POSTPROCEDURE EVALUATION
Patient: Michelle Corea    Procedure Summary     Date: 09/21/21 Room / Location: [facility] ROOM 25 / SURGERY SAME DAY [facility]    Anesthesia Start: 1617 Anesthesia Stop: 1731    Procedures:       WIDE EXCISION, LESION - UPPER ARM MALIGNANT MELANOMA (Left Arm Upper)      BIOPSY, LYMPH NODE, SENTINEL - AXILLARY (RADIONUCLIDE) WITHOUT IMAGING (Axilla) Diagnosis: (MALIGNANT MELANOMA OF UPPER ARM-LEFT)    Surgeons: Maverick Veliz M.D. Responsible Provider: Ronald Zapata M.D.    Anesthesia Type: general ASA Status: 1          Final Anesthesia Type: general  Last vitals  BP   Blood Pressure: 132/81    Temp   36.6 °C (97.9 °F)    Pulse   (!) 101   Resp   17    SpO2   98 %      Anesthesia Post Evaluation    Patient location during evaluation: PACU  Patient participation: complete - patient participated  Level of consciousness: awake and alert  Pain score: 2    Airway patency: patent  Anesthetic complications: no  Cardiovascular status: hemodynamically stable  Respiratory status: acceptable  Hydration status: euvolemic    PONV: none          No complications documented.

## 2021-09-22 NOTE — DISCHARGE INSTRUCTIONS
ACTIVITY: Rest and take it easy for the first 24 hours.  A responsible adult is recommended to remain with you during that time.  It is normal to feel sleepy.  We encourage you to not do anything that requires balance, judgment or coordination.    MILD FLU-LIKE SYMPTOMS ARE NORMAL. YOU MAY EXPERIENCE GENERALIZED MUSCLE ACHES, THROAT IRRITATION, HEADACHE AND/OR SOME NAUSEA.    FOR 24 HOURS DO NOT:  Drive, operate machinery or run household appliances.  Drink beer or alcoholic beverages.   Make important decisions or sign legal documents.    SPECIAL INSTRUCTIONS: Remove Coban wrap in 24-48 hours Remove plastic and gauze in 3 days. Leave underlying steristips on until they fall off Patient may shower on Post OP Day #2 (Thursday)    DIET: To avoid nausea, slowly advance diet as tolerated, avoiding spicy or greasy foods for the first day.  Add more substantial food to your diet according to your physician's instructions.  Babies can be fed formula or breast milk as soon as they are hungry.  INCREASE FLUIDS AND FIBER TO AVOID CONSTIPATION.    FOLLOW-UP APPOINTMENT:  A follow-up appointment should be arranged with your doctor; call to schedule.    You should CALL YOUR PHYSICIAN if you develop:  Fever greater than 101 degrees F.  Pain not relieved by medication, or persistent nausea or vomiting.  Excessive bleeding (blood soaking through dressing) or unexpected drainage from the wound.  Extreme redness or swelling around the incision site, drainage of pus or foul smelling drainage.  Inability to urinate or empty your bladder within 8 hours.  Problems with breathing or chest pain.    You should call 911 if you develop problems with breathing or chest pain.  If you are unable to contact your doctor or surgical center, you should go to the nearest emergency room or urgent care center.  Physician's telephone #: [phone]    If any questions arise, call your doctor.  If your doctor is not available, please feel free to call  the Surgical Center at [phone]. The Contact Center is open Monday through Friday 7AM to 5PM and may speak to a nurse at [phone], or toll free at [phone].     A registered nurse may call you a few days after your surgery to see how you are doing after your procedure.    MEDICATIONS: Resume taking daily medication.  Take prescribed pain medication with food.  If no medication is prescribed, you may take non-aspirin pain medication if needed.  PAIN MEDICATION CAN BE VERY CONSTIPATING.  Take a stool softener or laxative such as senokot, pericolace, or milk of magnesia if needed.    Prescription given for ______________________________________.  Last pain medication given at _____________________________.    If your physician has prescribed pain medication that includes Acetaminophen (Tylenol), do not take additional Acetaminophen (Tylenol) while taking the prescribed medication.    Depression / Suicide Risk    As you are discharged from this [facility] facility, it is important to learn how to keep safe from harming yourself.    Recognize the warning signs:  · Abrupt changes in personality, positive or negative- including increase in energy   · Giving away possessions  · Change in eating patterns- significant weight changes-  positive or negative  · Change in sleeping patterns- unable to sleep or sleeping all the time   · Unwillingness or inability to communicate  · Depression  · Unusual sadness, discouragement and loneliness  · Talk of wanting to die  · Neglect of personal appearance   · Rebelliousness- reckless behavior  · Withdrawal from people/activities they love  · Confusion- inability to concentrate     If you or a loved one observes any of these behaviors or has concerns about self-harm, here's what you can do:  · Talk about it- your feelings and reasons for harming yourself  · Remove any means that you might use to hurt yourself (examples: pills, rope, extension cords, firearm)  · Get  professional help from the community (Mental Health, Substance Abuse, psychological counseling)  · Do not be alone:Call your Safe Contact- someone whom you trust who will be there for you.  · Call your local CRISIS HOTLINE 650-7290 or 104-421-6664  · Call your local Children's Mobile Crisis Response Team [place] (106) 605-3543 or www.BRAINDIGIT  · Call the toll free National Suicide Prevention Hotlines   · National Suicide Prevention Lifeline 467-159-BKZI (5267)  · National Hope Line Network 800-SUICIDE (576-8245)

## 2021-10-06 ENCOUNTER — HOSPITAL ENCOUNTER (OUTPATIENT)
Dept: RADIOLOGY | Facility: MEDICAL CENTER | Age: 51
End: 2021-10-06
Attending: INTERNAL MEDICINE
Payer: COMMERCIAL

## 2021-10-06 DIAGNOSIS — Z79.899 NEED FOR PROPHYLACTIC CHEMOTHERAPY: ICD-10-CM

## 2021-10-06 DIAGNOSIS — D45 CHRONIC ERYTHREMIA IN REMISSION (HCC): ICD-10-CM

## 2021-10-06 PROCEDURE — A9576 INJ PROHANCE MULTIPACK: HCPCS | Performed by: INTERNAL MEDICINE

## 2021-10-06 PROCEDURE — 70553 MRI BRAIN STEM W/O & W/DYE: CPT

## 2021-10-06 PROCEDURE — 700117 HCHG RX CONTRAST REV CODE 255: Performed by: INTERNAL MEDICINE

## 2021-10-06 RX ADMIN — GADOTERIDOL 15 ML: 279.3 INJECTION, SOLUTION INTRAVENOUS at 16:54

## 2022-05-07 ENCOUNTER — HOSPITAL ENCOUNTER (OUTPATIENT)
Dept: RADIOLOGY | Facility: MEDICAL CENTER | Age: 52
End: 2022-05-07
Attending: INTERNAL MEDICINE
Payer: COMMERCIAL

## 2023-05-02 ENCOUNTER — HOSPITAL ENCOUNTER (OUTPATIENT)
Dept: RADIOLOGY | Facility: MEDICAL CENTER | Age: 53
End: 2023-05-02
Attending: INTERNAL MEDICINE
Payer: COMMERCIAL

## 2023-11-06 ENCOUNTER — HOSPITAL ENCOUNTER (OUTPATIENT)
Dept: RADIOLOGY | Facility: MEDICAL CENTER | Age: 53
End: 2023-11-06
Payer: COMMERCIAL

## 2024-01-25 ENCOUNTER — HOSPITAL ENCOUNTER (OUTPATIENT)
Facility: MEDICAL CENTER | Age: 54
End: 2024-01-25
Attending: INTERNAL MEDICINE
Payer: COMMERCIAL

## 2024-01-25 LAB
ANISOCYTOSIS BLD QL SMEAR: ABNORMAL
BASOPHILS # BLD AUTO: 0.9 % (ref 0–1.8)
BASOPHILS # BLD: 0.04 K/UL (ref 0–0.12)
COMMENT 1642: NORMAL
EOSINOPHIL # BLD AUTO: 0.04 K/UL (ref 0–0.51)
EOSINOPHIL NFR BLD: 0.9 % (ref 0–6.9)
ERYTHROCYTE [DISTWIDTH] IN BLOOD BY AUTOMATED COUNT: 72.3 FL (ref 35.9–50)
HCT VFR BLD AUTO: 46.4 % (ref 37–47)
HGB BLD-MCNC: 15.4 G/DL (ref 12–16)
IMM GRANULOCYTES # BLD AUTO: 0.02 K/UL (ref 0–0.11)
IMM GRANULOCYTES NFR BLD AUTO: 0.5 % (ref 0–0.9)
LYMPHOCYTES # BLD AUTO: 1.38 K/UL (ref 1–4.8)
LYMPHOCYTES NFR BLD: 31.4 % (ref 22–41)
MACROCYTES BLD QL SMEAR: ABNORMAL
MCH RBC QN AUTO: 37.1 PG (ref 27–33)
MCHC RBC AUTO-ENTMCNC: 33.2 G/DL (ref 32.2–35.5)
MCV RBC AUTO: 111.8 FL (ref 81.4–97.8)
MONOCYTES # BLD AUTO: 0.18 K/UL (ref 0–0.85)
MONOCYTES NFR BLD AUTO: 4.1 % (ref 0–13.4)
MORPHOLOGY BLD-IMP: NORMAL
NEUTROPHILS # BLD AUTO: 2.74 K/UL (ref 1.82–7.42)
NEUTROPHILS NFR BLD: 62.2 % (ref 44–72)
NRBC # BLD AUTO: 0 K/UL
NRBC BLD-RTO: 0 /100 WBC (ref 0–0.2)
OVALOCYTES BLD QL SMEAR: NORMAL
PLATELET # BLD AUTO: 444 K/UL (ref 164–446)
PLATELET BLD QL SMEAR: NORMAL
PMV BLD AUTO: 10.2 FL (ref 9–12.9)
POIKILOCYTOSIS BLD QL SMEAR: NORMAL
RBC # BLD AUTO: 4.15 M/UL (ref 4.2–5.4)
RBC BLD AUTO: PRESENT
STOMATOCYTES BLD QL SMEAR: NORMAL
WBC # BLD AUTO: 4.4 K/UL (ref 4.8–10.8)

## 2024-01-25 PROCEDURE — 85025 COMPLETE CBC W/AUTO DIFF WBC: CPT

## 2024-03-29 ENCOUNTER — OFFICE VISIT (OUTPATIENT)
Dept: URGENT CARE | Facility: CLINIC | Age: 54
End: 2024-03-29
Payer: COMMERCIAL

## 2024-03-29 VITALS
TEMPERATURE: 98.3 F | WEIGHT: 175 LBS | HEIGHT: 68 IN | OXYGEN SATURATION: 94 % | DIASTOLIC BLOOD PRESSURE: 58 MMHG | SYSTOLIC BLOOD PRESSURE: 102 MMHG | RESPIRATION RATE: 16 BRPM | BODY MASS INDEX: 26.52 KG/M2 | HEART RATE: 110 BPM

## 2024-03-29 DIAGNOSIS — J06.9 VIRAL URI WITH COUGH: ICD-10-CM

## 2024-03-29 LAB
FLUAV RNA SPEC QL NAA+PROBE: NEGATIVE
FLUBV RNA SPEC QL NAA+PROBE: NEGATIVE
RSV RNA SPEC QL NAA+PROBE: NEGATIVE
SARS-COV-2 RNA RESP QL NAA+PROBE: NEGATIVE

## 2024-03-29 RX ORDER — METHYLPREDNISOLONE 4 MG/1
TABLET ORAL
Qty: 21 TABLET | Refills: 0 | Status: SHIPPED | OUTPATIENT
Start: 2024-03-29

## 2024-03-29 RX ORDER — TRIAMCINOLONE ACETONIDE 1 MG/G
CREAM TOPICAL
COMMUNITY
Start: 2024-03-16

## 2024-03-29 RX ORDER — BENZONATATE 200 MG/1
200 CAPSULE ORAL 3 TIMES DAILY PRN
Qty: 30 CAPSULE | Refills: 0 | Status: SHIPPED | OUTPATIENT
Start: 2024-03-29

## 2024-03-29 ASSESSMENT — ENCOUNTER SYMPTOMS
CHILLS: 0
RHINORRHEA: 0
WHEEZING: 1
SHORTNESS OF BREATH: 0
COUGH: 1
MYALGIAS: 1
SINUS PAIN: 0
VOMITING: 0
HEMOPTYSIS: 0
DIARRHEA: 0
SPUTUM PRODUCTION: 0
FEVER: 0
ABDOMINAL PAIN: 0
SORE THROAT: 0
NAUSEA: 0
HEADACHES: 0

## 2024-03-29 NOTE — PROGRESS NOTES
Subjective     Michelle Corea is a 53 y.o. female who presents with Cough (rib pain: coughing, was wheezing last night x 2-3 days )            Cough  This is a new problem. Episode onset: 2 days. The problem has been unchanged. The problem occurs constantly. The cough is Non-productive. Associated symptoms include myalgias (rib pain from coughing) and wheezing. Pertinent negatives include no chest pain, chills, ear pain, fever, headaches, hemoptysis, nasal congestion, rhinorrhea, sore throat or shortness of breath. The symptoms are aggravated by lying down. She has tried OTC cough suppressant for the symptoms. The treatment provided mild relief. There is no history of asthma or pneumonia.           Past Medical History:   Diagnosis Date    Anesthesia     PONV    Cancer (HCC)     skin cancer    Cold 1.17.17    Disorder of thyroid     Hearing loss of right ear     Polycythemia vera (HCC)     PONV (postoperative nausea and vomiting)        Past Surgical History:   Procedure Laterality Date    MD BX/REMV,LYMPH NODE,DEEP AXILL  9/21/2021    Procedure: BIOPSY, LYMPH NODE, SENTINEL - AXILLARY (RADIONUCLIDE) WITHOUT IMAGING;  Surgeon: Maverick Veliz M.D.;  Location: SURGERY SAME DAY [facility];  Service: General    WIDE EXCISION Left 9/21/2021    Procedure: WIDE EXCISION, LESION - UPPER ARM MALIGNANT MELANOMA;  Surgeon: Maverick Veliz M.D.;  Location: SURGERY SAME DAY [facility];  Service: General    TYMPANOPLASTY Right 3/9/2017    Procedure: TYMPANOPLASTY;  Surgeon: Himanshu Whelan M.D.;  Location: SURGERY SAME DAY [facility];  Service:     OSSICULAR RECONSTRUCTION Right 3/9/2017    Procedure: OSSICULAR RECONSTRUCTION CHAIN WITH CARTILAGE GRAFT;  Surgeon: Himanshu Whelan M.D.;  Location: SURGERY SAME DAY [facility];  Service:     OTHER  2013    mastoidectomy    OTHER  2010    cholostetoma removal-right    DILATION AND CURETTAGE  2007       History reviewed. No pertinent family history.    Nkda [no known drug  "allergy]      Medications, Allergies, and current problem list reviewed today in Epic    Review of Systems   Constitutional:  Positive for malaise/fatigue. Negative for chills and fever.   HENT:  Negative for congestion, ear pain, rhinorrhea, sinus pain and sore throat.    Respiratory:  Positive for cough and wheezing. Negative for hemoptysis, sputum production and shortness of breath.    Cardiovascular:  Negative for chest pain and leg swelling.   Gastrointestinal:  Negative for abdominal pain, diarrhea, nausea and vomiting.   Musculoskeletal:  Positive for myalgias (rib pain from coughing).   Neurological:  Negative for headaches.     All other systems reviewed and are negative.              Objective     /58 (BP Location: Left arm, Patient Position: Sitting, BP Cuff Size: Adult)   Pulse (!) 110   Temp 36.8 °C (98.3 °F) (Temporal)   Resp 16   Ht 1.727 m (5' 8\")   Wt 79.4 kg (175 lb)   LMP 03/01/2020   SpO2 94%   BMI 26.61 kg/m²      Physical Exam  Constitutional:       General: She is not in acute distress.     Appearance: She is not ill-appearing.   HENT:      Head: Normocephalic and atraumatic.      Nose: Nose normal.      Mouth/Throat:      Mouth: Mucous membranes are moist.      Pharynx: No oropharyngeal exudate or posterior oropharyngeal erythema.   Eyes:      Conjunctiva/sclera: Conjunctivae normal.   Cardiovascular:      Rate and Rhythm: Regular rhythm. Tachycardia present.      Heart sounds: Normal heart sounds. No murmur heard.  Pulmonary:      Effort: Pulmonary effort is normal. No respiratory distress.      Breath sounds: No stridor. Wheezing (mild diffuse wheezes) present. No rhonchi or rales.   Skin:     General: Skin is warm and dry.      Findings: No rash.   Neurological:      General: No focal deficit present.      Mental Status: She is alert and oriented to person, place, and time.   Psychiatric:         Mood and Affect: Mood normal.         Behavior: Behavior normal.         Thought " Content: Thought content normal.         Judgment: Judgment normal.                             Assessment & Plan        1. Viral URI with cough    Viral etiology discussed. Continue conservative treatment.    - methylPREDNISolone (MEDROL DOSEPAK) 4 MG Tablet Therapy Pack; Follow schedule on package instructions.  Dispense: 21 Tablet; Refill: 0  - benzonatate (TESSALON) 200 MG capsule; Take 1 Capsule by mouth 3 times a day as needed for Cough.  Dispense: 30 Capsule; Refill: 0  - POCT CoV-2, Flu A/B, RSV by PCR- negative    Differential diagnoses, Supportive care, and indications for immediate follow-up discussed with patient.   Pathogenesis of diagnosis discussed including typical length and natural progression.   Instructed to return to clinic or nearest emergency department for any change in condition, further concerns, or worsening of symptoms.      The patient demonstrated a good understanding and agreed with the treatment plan.    Corina Ye P.A.-C.

## 2024-05-07 ENCOUNTER — APPOINTMENT (OUTPATIENT)
Dept: ADMISSIONS | Facility: MEDICAL CENTER | Age: 54
End: 2024-05-07
Attending: INTERNAL MEDICINE
Payer: COMMERCIAL

## 2024-05-08 ENCOUNTER — HOSPITAL ENCOUNTER (OUTPATIENT)
Dept: RADIOLOGY | Facility: MEDICAL CENTER | Age: 54
End: 2024-05-08
Attending: INTERNAL MEDICINE
Payer: COMMERCIAL

## 2024-05-08 DIAGNOSIS — D45 CHRONIC ERYTHREMIA IN REMISSION (HCC): ICD-10-CM

## 2024-05-08 DIAGNOSIS — C43.62 MALIGNANT MELANOMA OF LEFT UPPER EXTREMITY INCLUDING SHOULDER (HCC): ICD-10-CM

## 2024-05-08 RX ADMIN — GADOTERIDOL 15 ML: 279.3 INJECTION, SOLUTION INTRAVENOUS at 17:39

## 2024-05-10 ENCOUNTER — PRE-ADMISSION TESTING (OUTPATIENT)
Dept: ADMISSIONS | Facility: MEDICAL CENTER | Age: 54
End: 2024-05-10
Attending: INTERNAL MEDICINE
Payer: COMMERCIAL

## 2024-05-10 RX ORDER — ZINC SULFATE 50(220)MG
220 CAPSULE ORAL DAILY
COMMUNITY

## 2024-05-20 ENCOUNTER — HOSPITAL ENCOUNTER (OUTPATIENT)
Facility: MEDICAL CENTER | Age: 54
End: 2024-05-20
Attending: HOSPITALIST | Admitting: INTERNAL MEDICINE
Payer: COMMERCIAL

## 2024-05-20 VITALS
BODY MASS INDEX: 26.3 KG/M2 | WEIGHT: 173.5 LBS | RESPIRATION RATE: 16 BRPM | HEART RATE: 77 BPM | OXYGEN SATURATION: 95 % | HEIGHT: 68 IN | DIASTOLIC BLOOD PRESSURE: 70 MMHG | SYSTOLIC BLOOD PRESSURE: 107 MMHG | TEMPERATURE: 97.4 F

## 2024-05-20 DIAGNOSIS — D45 POLYCYTHEMIA VERA (HCC): ICD-10-CM

## 2024-05-20 LAB
BASOPHILS # BLD AUTO: 0.5 % (ref 0–1.8)
BASOPHILS # BLD: 0.02 K/UL (ref 0–0.12)
EOSINOPHIL # BLD AUTO: 0.06 K/UL (ref 0–0.51)
EOSINOPHIL NFR BLD: 1.5 % (ref 0–6.9)
ERYTHROCYTE [DISTWIDTH] IN BLOOD BY AUTOMATED COUNT: 63.2 FL (ref 35.9–50)
HCT VFR BLD AUTO: 42.8 % (ref 37–47)
HGB BLD-MCNC: 14.7 G/DL (ref 12–16)
HGB RETIC QN AUTO: 35.2 PG/CELL (ref 29–35)
IMM GRANULOCYTES # BLD AUTO: 0.02 K/UL (ref 0–0.11)
IMM GRANULOCYTES NFR BLD AUTO: 0.5 % (ref 0–0.9)
IMM RETICS NFR: 15.7 % (ref 2.6–16.1)
LYMPHOCYTES # BLD AUTO: 0.65 K/UL (ref 1–4.8)
LYMPHOCYTES NFR BLD: 15.8 % (ref 22–41)
MCH RBC QN AUTO: 39.7 PG (ref 27–33)
MCHC RBC AUTO-ENTMCNC: 34.3 G/DL (ref 32.2–35.5)
MCV RBC AUTO: 115.7 FL (ref 81.4–97.8)
MONOCYTES # BLD AUTO: 0.17 K/UL (ref 0–0.85)
MONOCYTES NFR BLD AUTO: 4.1 % (ref 0–13.4)
NEUTROPHILS # BLD AUTO: 3.19 K/UL (ref 1.82–7.42)
NEUTROPHILS NFR BLD: 77.6 % (ref 44–72)
NRBC # BLD AUTO: 0 K/UL
NRBC BLD-RTO: 0 /100 WBC (ref 0–0.2)
PATHOLOGY CONSULT NOTE: NORMAL
PLATELET # BLD AUTO: 331 K/UL (ref 164–446)
PMV BLD AUTO: 10 FL (ref 9–12.9)
RBC # BLD AUTO: 3.7 M/UL (ref 4.2–5.4)
RETICS # AUTO: 0.07 M/UL (ref 0.04–0.12)
RETICS/RBC NFR: 1.9 % (ref 0.8–2.6)
WBC # BLD AUTO: 4.1 K/UL (ref 4.8–10.8)

## 2024-05-20 PROCEDURE — 38222 DX BONE MARROW BX & ASPIR: CPT | Performed by: HOSPITALIST

## 2024-05-20 PROCEDURE — 99152 MOD SED SAME PHYS/QHP 5/>YRS: CPT | Performed by: HOSPITALIST

## 2024-05-20 RX ORDER — MIDAZOLAM HYDROCHLORIDE 1 MG/ML
.5-2 INJECTION INTRAMUSCULAR; INTRAVENOUS PRN
Status: DISCONTINUED | OUTPATIENT
Start: 2024-05-20 | End: 2024-05-20 | Stop reason: HOSPADM

## 2024-05-20 RX ORDER — SODIUM CHLORIDE 9 MG/ML
500 INJECTION, SOLUTION INTRAVENOUS
Status: DISCONTINUED | OUTPATIENT
Start: 2024-05-20 | End: 2024-05-20 | Stop reason: HOSPADM

## 2024-05-20 RX ORDER — MIDAZOLAM HYDROCHLORIDE 1 MG/ML
INJECTION INTRAMUSCULAR; INTRAVENOUS
Status: DISCONTINUED
Start: 2024-05-20 | End: 2024-05-20 | Stop reason: HOSPADM

## 2024-05-20 RX ORDER — SODIUM CHLORIDE, SODIUM LACTATE, POTASSIUM CHLORIDE, CALCIUM CHLORIDE 600; 310; 30; 20 MG/100ML; MG/100ML; MG/100ML; MG/100ML
INJECTION, SOLUTION INTRAVENOUS CONTINUOUS
Status: DISCONTINUED | OUTPATIENT
Start: 2024-05-20 | End: 2024-05-20 | Stop reason: HOSPADM

## 2024-05-20 RX ADMIN — SODIUM CHLORIDE, POTASSIUM CHLORIDE, SODIUM LACTATE AND CALCIUM CHLORIDE: 600; 310; 30; 20 INJECTION, SOLUTION INTRAVENOUS at 10:50

## 2024-05-20 ASSESSMENT — PAIN DESCRIPTION - PAIN TYPE
TYPE: SURGICAL PAIN
TYPE: ACUTE PAIN
TYPE: SURGICAL PAIN

## 2024-05-20 NOTE — PROCEDURES
Bone Marrow Biopsy/Aspiration    Date/Time: 5/20/2024 12:09 PM    Performed by: Kem Beach M.D.  Authorized by: Kem Beach M.D.    Consent:     Consent obtained:  Written    Consent given by:  Patient    Risks discussed:  Bleeding and pain    Alternatives discussed:  No treatment  Universal protocol:     Procedure explained and questions answered to patient or proxy's satisfaction: yes      Relevant documents present and verified: yes      Test results available and properly labeled: yes      Imaging studies available: yes      Required blood products, implants, devices, and special equipment available: yes      Immediately prior to procedure a time out was called: yes      Site/side marked: yes      Patient identity confirmed:  Hospital-assigned identification number  Pre-procedure details:     Procedure type:  Aspiration and biopsy    Requesting physician:  Link    Indications:  Polycytemia Vera    Position:  Prone    Buttock laterality:  Left    Local anesthetic:  1% Lidocaine    Subcutaneous volume:  1 mL    Periosteum anesthetic volume:  4 mL    Preparation: Patient was prepped and draped in usual sterile fashion    Sedation:     Patient Sedated: Yes      Sedation type: moderate (conscious) sedation      Sedation:  Midazolam    Sedation Dosage:  2 mg    Analgesia:  Fentanyl    Analgesia Dosage:  100 mcg    : 12:14.    Sedation Stop Time:  12:19 PDT    Moderate sedation charge selection based on time above is:  15 minutes      I was personally present and supervised the patient throughout the entirety of the moderate sedation time mentioned above.  Procedure details:     Aspirate obtained:  5 mL followed by 5 mL    Biopsy performed:  1 core    Number of attempts:  2    Estimated blood loss (mL): 2.  Post-procedure:     Puncture site:  Adhesive bandage applied    Patient tolerance of procedure:  Tolerated well, no immediate complications  Comments:      I personally monitored her until 12:30 due to  conscious sedation

## 2024-05-20 NOTE — OR NURSING
1250- Received report from Allison BENSON.    1255- Patient tolerating sips of juice.     1310- Discharge instructions reviewed and signed with patient and  at bedside.     1316- PIV removed with tip intact. Patient discharged home with all belongings.

## 2024-05-20 NOTE — DISCHARGE INSTRUCTIONS
BONE MARROW ASPIRATION & BIOPSY DISCHARGE INSTRUCTIONS    After the numbing medicine wears off, you may feel some discomfort.    Keep bandage clean and dry for 24 hours.  After this time, you can change the bandage.  You may now bathe or shower.    If bleeding occurs after your bone marrow aspiration or biopsy, apply pressure to the area and call your doctor immediately.    Call your doctor if pain persists for greater than 24 hours in the area where you had your aspiration or biopsy.    Call your doctor immediately if you notice redness or drainage in the area or if you have a fever.    Call your doctor if you have numbness or weakness in the area where the doctor took the bone marrow or down your leg.    Do not drive or drink alcohol for 24 hours if you have had sedation medication.  The medication will make you drowsy.    Resume your regular diet.    Follow up with your doctor.    I acknowledge receipt and understanding of these Home Care Instructions.    What to Expect Post Anesthesia    Rest and take it easy for the first 24 hours.  A responsible adult is recommended to remain with you during that time.  It is normal to feel sleepy.  We encourage you to not do anything that requires balance, judgment or coordination.    FOR 24 HOURS DO NOT:  Drive, operate machinery or run household appliances.  Drink beer or alcoholic beverages.  Make important decisions or sign legal documents.    To avoid nausea, slowly advance diet as tolerated, avoiding spicy or greasy foods for the first day.  Add more substantial food to your diet according to your provider's instructions.  Babies can be fed formula or breast milk as soon as they are hungry.  INCREASE FLUIDS AND FIBER TO AVOID CONSTIPATION.    MILD FLU-LIKE SYMPTOMS ARE NORMAL.  YOU MAY EXPERIENCE GENERALIZED MUSCLE ACHES, THROAT IRRITATION, HEADACHE AND/OR SOME NAUSEA.

## 2024-05-20 NOTE — OR NURSING
1223: Pt arrived from OR to PACU 1. Connected to monitor. Report received from anesthesia & RN. VSS. On room air. Breaths calm, even, and unlabored.  No signs of pain. Bandaid in place CD&I.     1230 placed on 1L NC.     1243 patient tolerating water    1247: Updated  on patient status in recovery.     1250 handoff to Catherine Miller

## 2024-05-24 ENCOUNTER — HOSPITAL ENCOUNTER (OUTPATIENT)
Dept: RADIOLOGY | Facility: MEDICAL CENTER | Age: 54
End: 2024-05-24
Payer: COMMERCIAL

## 2024-11-11 ENCOUNTER — HOSPITAL ENCOUNTER (OUTPATIENT)
Dept: RADIOLOGY | Facility: MEDICAL CENTER | Age: 54
End: 2024-11-11
Attending: PODIATRIST
Payer: COMMERCIAL

## 2024-11-11 DIAGNOSIS — M76.71 PERONEAL TENDINITIS OF RIGHT LOWER EXTREMITY: ICD-10-CM

## 2024-11-11 PROCEDURE — 73721 MRI JNT OF LWR EXTRE W/O DYE: CPT | Mod: RT

## (undated) DEVICE — PACK MINOR BASIN - (2EA/CA)

## (undated) DEVICE — PROTECTOR ULNA NERVE - (36PR/CA)

## (undated) DEVICE — ADHESIVE MASTISOL - (48/BX)

## (undated) DEVICE — SUTURE 4-0 VICRYL PLUS PC-3 18 (12PK/BX)"

## (undated) DEVICE — MASK AIRWAY SIZE 3 UNIQUE SILICON (10/BX)

## (undated) DEVICE — CANISTER SUCTION RIGID RED 1500CC (40EA/CA)

## (undated) DEVICE — SPONGE GAUZESTER. 2X2 4-PL - (2/PK 50PK/BX 30BX/CS)

## (undated) DEVICE — TUBING CLEARLINK DUO-VENT - C-FLO (48EA/CA)

## (undated) DEVICE — ELECTRODE 850 FOAM ADHESIVE - HYDROGEL RADIOTRNSPRNT (50/PK)

## (undated) DEVICE — GOWN SURGEONS X-LARGE - DISP. (30/CA)

## (undated) DEVICE — SYRINGE SAFETY TB 1 ML 27 GA X 1/2 IN (100/BX 4BX/CA)

## (undated) DEVICE — BLADE 45 DEGREE CAEAR TIP NARROW SHAFT S/SU (6/CA)

## (undated) DEVICE — GLOVE SZ 7.5 BIOGEL PI MICRO - PF LF (50PR/BX)

## (undated) DEVICE — SUTURE 5-0 PROLENE P-3 - (12/BX)

## (undated) DEVICE — CANISTER SUCTION 3000ML MECHANICAL FILTER AUTO SHUTOFF MEDI-VAC NONSTERILE LF DISP  (40EA/CA)

## (undated) DEVICE — SET LEADWIRE 5 LEAD BEDSIDE DISPOSABLE ECG (1SET OF 5/EA)

## (undated) DEVICE — BOVIE BLADE COATED &INSULATED - 25/PK

## (undated) DEVICE — TOWELS CLOTH SURGICAL - (4/PK 20PK/CA)

## (undated) DEVICE — LACTATED RINGERS INJ 1000 ML - (14EA/CA 60CA/PF)

## (undated) DEVICE — BANDAID SHEER STRIP 3/4 IN (100EA/BX 12BX/CA)

## (undated) DEVICE — ELECTRODE DUAL RETURN W/ CORD - (50/PK)

## (undated) DEVICE — GLOVE BIOGEL INDICATOR SZ 6.5 SURGICAL PF LTX - (50PR/BX 4BX/CA)

## (undated) DEVICE — SENSOR OXIMETER ADULT SPO2 RD SET (20EA/BX)

## (undated) DEVICE — SPONGE GAUZESTER 4 X 4 4PLY - (128PK/CA)

## (undated) DEVICE — GLOVE BIOGEL INDICATOR SZ 7.5 SURGICAL PF LTX - (50PR/BX 4BX/CA)

## (undated) DEVICE — SUTURE GENERAL

## (undated) DEVICE — BLADE BEAVER 7200 (ENT) - 60 ANGLE (3EA/SP)

## (undated) DEVICE — BANDAGE STER SOF-FORM 4X75IN - (12EA/BX 8BX/CA)

## (undated) DEVICE — SUTURE 4-0 MONOCRYL PLUS PS-2 - 27 INCH (36/BX)

## (undated) DEVICE — WIPE INSTRUMENT MICROWIPE (20EA/SP)

## (undated) DEVICE — SOD. CHL 10CC SYRINGE PREFILL - W/10 CC (30/BX)

## (undated) DEVICE — WATER IRRIG. STER 3000 ML - (4/CA)

## (undated) DEVICE — CANNULA DIVIDED ADULT CO2 - SAMPLE W/FEMALE CONNCT (25/CA)

## (undated) DEVICE — TUBING SET IRRIG XOMED (NEW) - (4/PK)

## (undated) DEVICE — SYRINGE SAFETY 10 ML 18 GA X 1 1/2 BLUNT LL (100/BX 4BX/CA)

## (undated) DEVICE — ANTI-FOG SOLUTION - 60BTL/CA

## (undated) DEVICE — GOWN WARMING STANDARD FLEX - (30/CA)

## (undated) DEVICE — CORDS BIPOLAR COAGULATION - 12FT STERILE DISP. (10EA/BX)

## (undated) DEVICE — DRAPE MICROSCOPE 46 X 80 - 10/CA

## (undated) DEVICE — GOWN SURGEONS LARGE - (32/CA)

## (undated) DEVICE — SHEET TRANSVERSE LAP - (12EA/CA)

## (undated) DEVICE — HEAD HOLDER JUNIOR/ADULT

## (undated) DEVICE — SLING ORTH UNV TIETX VLFM ARM

## (undated) DEVICE — TUBE CONNECTING SUCTION - CLEAR PLASTIC STERILE 72 IN (50EA/CA)

## (undated) DEVICE — CATHETER IV 14 GA X 2 ---SURG.& SDS ONLY---(200EA/CA)

## (undated) DEVICE — CLOSURE SKIN STRIP 1/2 X 4 IN - (STERI STRIP) (50/BX 4BX/CA)

## (undated) DEVICE — CHLORAPREP 26 ML APPLICATOR - ORANGE TINT(25/CA)

## (undated) DEVICE — SENSOR SPO2 NEO LNCS ADHESIVE (20/BX) SEE USER NOTES

## (undated) DEVICE — PENCIL ELECTSURG 10FT BTN SWH - (50/CA)

## (undated) DEVICE — SUTURE 4-0 VICRYL PLUS FS-2 - 27 INCH (36/BX)

## (undated) DEVICE — BALL COTTON STERILE 5/PK - (5/PK 25PK/CA)

## (undated) DEVICE — BLADE SURGICAL CLIPPER - (50EA/CA)

## (undated) DEVICE — STERI STRIP COMPOUND BENZOIN - TINCTURE 0.6ML WITH APPLICATOR (40EA/BX)

## (undated) DEVICE — SURGIFOAM (12X7) - (12EA/CA)

## (undated) DEVICE — GLOVE BIOGEL PI INDICATOR SZ 6.5 SURGICAL PF LF - (50/BX 4BX/CA)

## (undated) DEVICE — CATHETER IV 20 GA X 1-1/4 ---SURG.& SDS ONLY--- (50EA/BX)

## (undated) DEVICE — MASK ANESTHESIA ADULT  - (100/CA)

## (undated) DEVICE — DRAPE IOBAN II INCISE 23X17 - (10EA/BX 4BX/CA)

## (undated) DEVICE — DRESSING GELFILM ENT - (6EA/CT)

## (undated) DEVICE — KIT  I.V. START (100EA/CA)

## (undated) DEVICE — NEPTUNE 4 PORT MANIFOLD - (20/PK)

## (undated) DEVICE — SODIUM CHL IRRIGATION 0.9% 1000ML (12EA/CA)

## (undated) DEVICE — LACTATED RINGERS INJ. 500 ML - (24EA/CA)

## (undated) DEVICE — ELECTRODES PAIRED NEEDLE - (1/BX)

## (undated) DEVICE — DRAPE STRLE REG TOWEL 18X24 - (10/BX 4BX/CA)"

## (undated) DEVICE — CLOSURE WOUND 1/4 X 4 (STERI - STRIP) (50/BX 4BX/CA)

## (undated) DEVICE — SUCTION INSTRUMENT YANKAUER BULBOUS TIP W/O VENT (50EA/CA)

## (undated) DEVICE — SYRINGE SAFETY 3 ML 18 GA X 1 1/2 BLUNT LL (100/BX 8BX/CA)

## (undated) DEVICE — SYRINGE 3 CC 22 GA X 1-1/2 - NDL SAFETY (50/BX 8BX/CA)

## (undated) DEVICE — DRAPE LARGE 3 QUARTER - (20/CA)

## (undated) DEVICE — BLADE BEAVER 6400 MINI EYE ROUND TIP SHARP ON ONE SIDE (20/CA)

## (undated) DEVICE — TOWEL STOP TIMEOUT SAFETY FLAG (40EA/CA)

## (undated) DEVICE — PACK ENT OR - (2EA/CA)

## (undated) DEVICE — SLEEVE, VASO, THIGH, MED

## (undated) DEVICE — DRAPE LAPAROTOMY T SHEET - (12EA/CA)

## (undated) DEVICE — SUTURE 3-0 ETHILON FS-1 - (36/BX) 30 INCH

## (undated) DEVICE — NEEDLE NON SAFETY 27GA X 1-1/4 IN HYPO (100EA/BX)

## (undated) DEVICE — SURGIFOAM (SIZE 100) - (6EA/CA)

## (undated) DEVICE — SUTURE 3-0 VICRYL PLUS SH - 8X 18 INCH (12/BX)

## (undated) DEVICE — SYRINGE NON SAFETY 5 CC 20 GA X 1-1/2 IN (100/BX 4BX/CA)

## (undated) DEVICE — GLOVE BIOGEL SZ 7 SURGICAL PF LTX - (50PR/BX 4BX/CA)

## (undated) DEVICE — DISH PETRI STERILE (50EA/CA)

## (undated) DEVICE — TRAY SRGPRP PVP IOD WT PRP - (20/CA)

## (undated) DEVICE — BANDAGE STERILE 3 IN X 75 IN (12EA/BX 8BX/CA)

## (undated) DEVICE — LEAD SET 6 DISP. EKG NIHON KOHDEN (100EA/CA)

## (undated) DEVICE — KIT ANESTHESIA W/CIRCUIT & 3/LT BAG W/FILTER (20EA/CA)

## (undated) DEVICE — DRESSING TRANSPARENT FILM TEGADERM 2.375 X 2.75"  (100EA/BX)"

## (undated) DEVICE — DRESSING TRANSPARENT FILM TEGADERM 4 X 4.75" (50EA/BX)"

## (undated) DEVICE — SET EXTENSION 31IN APPX 3.2ML WITH CLAMP (50/CA)WAS 4610-03

## (undated) DEVICE — DRAPE LG. APERTURE 33 X 51" - (10EA/BX)"